# Patient Record
Sex: FEMALE | Race: WHITE | URBAN - METROPOLITAN AREA
[De-identification: names, ages, dates, MRNs, and addresses within clinical notes are randomized per-mention and may not be internally consistent; named-entity substitution may affect disease eponyms.]

---

## 2017-02-02 ENCOUNTER — OFFICE VISIT (OUTPATIENT)
Dept: INTERNAL MEDICINE CLINIC | Age: 52
End: 2017-02-02

## 2017-02-02 VITALS
DIASTOLIC BLOOD PRESSURE: 68 MMHG | TEMPERATURE: 98.2 F | HEART RATE: 60 BPM | HEIGHT: 66 IN | BODY MASS INDEX: 22.56 KG/M2 | WEIGHT: 140.4 LBS | SYSTOLIC BLOOD PRESSURE: 110 MMHG

## 2017-02-02 DIAGNOSIS — B35.9 TINEA: Primary | ICD-10-CM

## 2017-02-02 PROCEDURE — 99212 OFFICE O/P EST SF 10 MIN: CPT | Performed by: INTERNAL MEDICINE

## 2017-02-02 RX ORDER — CLOTRIMAZOLE 1 %
CREAM (GRAM) TOPICAL
Qty: 30 G | Refills: 0 | Status: SHIPPED | OUTPATIENT
Start: 2017-02-02 | End: 2017-05-08 | Stop reason: ALTCHOICE

## 2017-02-02 ASSESSMENT — ENCOUNTER SYMPTOMS: COLOR CHANGE: 1

## 2017-05-08 ENCOUNTER — OFFICE VISIT (OUTPATIENT)
Dept: INTERNAL MEDICINE CLINIC | Age: 52
End: 2017-05-08

## 2017-05-08 VITALS
HEIGHT: 66 IN | DIASTOLIC BLOOD PRESSURE: 70 MMHG | BODY MASS INDEX: 21.92 KG/M2 | HEART RATE: 89 BPM | TEMPERATURE: 98.3 F | SYSTOLIC BLOOD PRESSURE: 112 MMHG | WEIGHT: 136.4 LBS

## 2017-05-08 DIAGNOSIS — N39.0 URINARY TRACT INFECTION, SITE UNSPECIFIED: Primary | ICD-10-CM

## 2017-05-08 LAB
BILIRUBIN, POC: ABNORMAL
BLOOD URINE, POC: ABNORMAL
CLARITY, POC: ABNORMAL
COLOR, POC: YELLOW
GLUCOSE URINE, POC: ABNORMAL
KETONES, POC: 40
LEUKOCYTE EST, POC: ABNORMAL
NITRITE, POC: ABNORMAL
PH, POC: 5.5
PROTEIN, POC: 100
SPECIFIC GRAVITY, POC: 1.03
UROBILINOGEN, POC: 0.2

## 2017-05-08 PROCEDURE — 99212 OFFICE O/P EST SF 10 MIN: CPT | Performed by: INTERNAL MEDICINE

## 2017-05-08 PROCEDURE — 81002 URINALYSIS NONAUTO W/O SCOPE: CPT | Performed by: INTERNAL MEDICINE

## 2017-05-08 RX ORDER — CEFUROXIME AXETIL 250 MG/1
250 TABLET ORAL 2 TIMES DAILY
Qty: 14 TABLET | Refills: 0 | Status: SHIPPED | OUTPATIENT
Start: 2017-05-08 | End: 2018-02-13 | Stop reason: SDUPTHER

## 2017-05-08 ASSESSMENT — ENCOUNTER SYMPTOMS
ABDOMINAL DISTENTION: 1
NAUSEA: 1
DIARRHEA: 0
VOMITING: 0

## 2017-05-10 LAB
ORGANISM: ABNORMAL
URINE CULTURE, ROUTINE: ABNORMAL

## 2017-08-03 ENCOUNTER — TELEPHONE (OUTPATIENT)
Dept: INTERNAL MEDICINE CLINIC | Age: 52
End: 2017-08-03

## 2018-02-13 ENCOUNTER — OFFICE VISIT (OUTPATIENT)
Dept: INTERNAL MEDICINE CLINIC | Age: 53
End: 2018-02-13

## 2018-02-13 VITALS
SYSTOLIC BLOOD PRESSURE: 110 MMHG | DIASTOLIC BLOOD PRESSURE: 80 MMHG | BODY MASS INDEX: 22.69 KG/M2 | WEIGHT: 141.2 LBS | HEART RATE: 57 BPM | TEMPERATURE: 97.6 F | HEIGHT: 66 IN

## 2018-02-13 DIAGNOSIS — N39.0 URINARY TRACT INFECTION WITHOUT HEMATURIA, SITE UNSPECIFIED: Primary | ICD-10-CM

## 2018-02-13 LAB
BILIRUBIN, POC: ABNORMAL
BLOOD URINE, POC: ABNORMAL
CLARITY, POC: CLEAR
COLOR, POC: YELLOW
GLUCOSE URINE, POC: ABNORMAL
KETONES, POC: ABNORMAL
LEUKOCYTE EST, POC: ABNORMAL
NITRITE, POC: ABNORMAL
PH, POC: 7
PROTEIN, POC: ABNORMAL
SPECIFIC GRAVITY, POC: 1.02
UROBILINOGEN, POC: ABNORMAL

## 2018-02-13 PROCEDURE — 99213 OFFICE O/P EST LOW 20 MIN: CPT | Performed by: INTERNAL MEDICINE

## 2018-02-13 PROCEDURE — 81002 URINALYSIS NONAUTO W/O SCOPE: CPT | Performed by: INTERNAL MEDICINE

## 2018-02-13 RX ORDER — CEFUROXIME AXETIL 250 MG/1
250 TABLET ORAL 2 TIMES DAILY
Qty: 14 TABLET | Refills: 0 | Status: SHIPPED | OUTPATIENT
Start: 2018-02-13 | End: 2018-02-20

## 2018-02-13 ASSESSMENT — ENCOUNTER SYMPTOMS
NAUSEA: 0
VOMITING: 0

## 2018-02-15 LAB — URINE CULTURE, ROUTINE: NORMAL

## 2018-06-18 ENCOUNTER — TELEPHONE (OUTPATIENT)
Dept: INTERNAL MEDICINE CLINIC | Age: 53
End: 2018-06-18

## 2018-06-21 ENCOUNTER — TELEPHONE (OUTPATIENT)
Dept: INTERNAL MEDICINE CLINIC | Age: 53
End: 2018-06-21

## 2023-01-26 ENCOUNTER — HOSPITAL ENCOUNTER (OUTPATIENT)
Dept: PHYSICAL THERAPY | Age: 58
Setting detail: THERAPIES SERIES
Discharge: HOME | End: 2023-01-26
Payer: COMMERCIAL

## 2023-01-26 PROCEDURE — 97530 THERAPEUTIC ACTIVITIES: CPT

## 2023-01-26 PROCEDURE — 97140 MANUAL THERAPY 1/> REGIONS: CPT

## 2023-01-26 PROCEDURE — 97161 PT EVAL LOW COMPLEX 20 MIN: CPT

## 2023-01-26 NOTE — FLOWSHEET NOTE
[facility] - Outpatient Rehabilitation and Therapy, [facility]  [address] [facility], [facility]  Phone: [phone]   Fax:     [phone]      Physical Therapy Treatment Note/ Progress Report:     Date:  2023    Patient Name:  Prerna Yuan \"Ewa\"   :  1965  MRN: [id number]    Pertinent Medical History:     Medical/Treatment Diagnosis Information:  Medical Diagnosis: Closed displaced fracture of body of talus [S92.123A]  Treatment Diagnosis: pain, dec gait/ROM/strength limiting ADLs    Insurance/Certification information:  PT Insurance Information: [facility] - 18 visit; end date extension 23-3/31/23  Physician Information:  Jorden Smith MD  Plan of care signed (Y/N): faxed at [facility]     Date of Patient follow up with Physician:      Progress Report: []  Yes  [x]  No     Date Range for reporting period:  Beginnin2023  Ending:      Progress report due (10 Rx/or 30 days whichever is less): 39     Recertification due (POC duration/ or 90 days whichever is less):      Visit # POC/Insurance Allowable Auth Needed     18 visits   23-3/31/23 [x]Yes   []No     Latex Allergy:  [x]NO      []YES  Preferred Language for Healthcare:   [x]English       []Other:    Functional Scale:       Date assessed: at eval  Test:WOMAC  Score:Raw score 58    Pain level:  3-6/10     History of Injury: R ankle/talus fx on 22 while stepping off pallet at work. She had ORIF on . Most pain is across top of foot ranges from 3-6/10 with c/o pain waking her at night. She is not icing, edu on benefits of ice daily; taking OTC ibuprofen; has started some light yoga/pilates and TB PF ex; denies N/T. Pt is off work right now.      SUBJECTIVE:  See eval    OBJECTIVE:  Observation:   Test measurements:      RESTRICTIONS/PRECAUTIONS:     Exercises/Interventions:     Therapeutic Ex (32289)   Min:5 Reps/Resistance Notes/CUES  R ankle talus fx/ORIF   Airex  Edu for hep including: AROM, calf and inv/evr str - see below   GSS incline  Sol stretch incline vs step     MWM DF stretch on step     Standing pronate/supinate               Seated:   HR/TR   Inv/evr pillowcase pull   Heel slide          TB 4 way           Therapeutic Activity (10450) Min: 10 See below    Amb in ll bars: Fwd without HR               NMR re-education (50025)  Min:  CUES NEEDED   Gumdrop roll cw/ccw     Baps L1 PWB 4 way      Rocker board f/b s/s               Manual Intervention ([phone]) Min: 10     Joint mobs   TC, subtalar, MT   Gentle grd 1-2     PROM 4 way X 10 each                    Modalities  Min:x 15 min      CP  Supine w/ wedge x 15 min                 Other Therapeutic Activities: Pt was educated on PT POC, Diagnosis, Prognosis, pathomechanics as well as frequency and duration of scheduling future physical therapy appointments. Time was also taken on this day to answer all patient questions and participation in PT. Reviewed appointment policy in detail with patient and patient verbalized understanding. Home Exercise Program: Patient was instructed in the following for HEP:     . Patient verbalized/demonstrated understanding and was issued written handout. Access Code: [email or web address]  URL: [email or web address]  Date: 01/26/2023  Prepared by: Margarita Allen     Exercises  Supine Single Leg Ankle Pumps - 1-2 x daily - 7 x weekly - 1 sets - 10 reps  Supine Ankle Circles - 1-2 x daily - 7 x weekly - 1 sets - 10 reps  Supine Ankle Inversion Eversion AROM - 1-2 x daily - 7 x weekly - 1 sets - 10 reps  Seated Toe Curl - 1-2 x daily - 7 x weekly - 1 sets - 10 reps  Towel Scrunches - 1-2 x daily - 7 x weekly - 1 sets - 10 reps  Ankle Alphabet in Elevation - 1-2 x daily - 7 x weekly - 1 sets - 10 reps  Long Sitting Calf Stretch with Strap - 1 x daily - 7 x weekly - 1 sets - 3-5 reps - 10 hold  Seated Ankle Inversion Eversion PROM - 1 x daily - 7 x weekly - 3 sets - 3-5 reps - 10 hold    Therapeutic Exercise and NMR EXR  [x] (23974) Provided verbal/tactile cueing for activities related to strengthening, flexibility, endurance, ROM for improvements in LE, proximal hip, and core control with self care, mobility, lifting, ambulation.  [] (50981) Provided verbal/tactile cueing for activities related to improving balance, coordination, kinesthetic sense, posture, motor skill, proprioception  to assist with LE, proximal hip, and core control in self care, mobility, lifting, ambulation and eccentric single leg control.  [address] and Therapeutic Activities:    [x] (65045 or 84687) Provided verbal/tactile cueing for activities related to improving balance, coordination, kinesthetic sense, posture, motor skill, proprioception and motor activation to allow for proper function of core, proximal hip and LE with self care and ADLs and functional mobility.   [] (02578) Gait Re-education- Provided training and instruction to the patient for proper LE, core and proximal hip recruitment and positioning and eccentric body weight control with ambulation re-education including up and down stairs     Home Exercise Program:    [x] (69414) Reviewed/Progressed HEP activities related to strengthening, flexibility, endurance, ROM of core, proximal hip and LE for functional self-care, mobility, lifting and ambulation/stair navigation   [] (34723)Reviewed/Progressed HEP activities related to improving balance, coordination, kinesthetic sense, posture, motor skill, proprioception of core, proximal hip and LE for self care, mobility, lifting, and ambulation/stair navigation      Manual Treatments:  PROM / STM / Oscillations-Mobs:  G-I, II, III, IV (PA's, Inf., Post.)  [x] (29111) Provided manual therapy to mobilize LE, proximal hip and/or LS spine soft tissue/joints for the purpose of modulating pain, promoting relaxation,  increasing ROM, reducing/eliminating soft tissue swelling/inflammation/restriction, improving soft tissue extensibility and allowing for proper ROM for normal function with self care, mobility, lifting and ambulation. If BWC Please Indicate Time In/Out  CPT Code Time in Time out   Eval 8:50 9:10   Ex 9:10 9:15   NMR     Man 9:15 9:25   TA 9:25 9:35   CP 9:35 9:50       Charges:  Timed Code Treatment Minutes: 25   Total Treatment Minutes: 60      [x] EVAL (LOW) 79360 (typically 20 minutes face-to-face)  [] EVAL (MOD) 21529 (typically 30 minutes face-to-face)  [] EVAL (HIGH) 58625 (typically 45 minutes face-to-face)  [] RE-EVAL     [] XR(15373) x     [] Dry needle 1 or 2 Muscles (28967)  [] NMR (52019) x     [] Dry needle 3+ Muscles (36326)  [x] Manual (02325) x     [] Ultrasound (93820) x  [x] TA (91599) x     [] Mech Traction (41514)  [] ES(attended) (84021)     [] ES (un) (12766):   [] Vasopump (48209) [] Ionto (64285)   [] Other:    GOALS:  Patient stated goal: \" walk without brace\"  [] Progressing: [] Met: [] Not Met: [] Adjusted     Therapist goals for Patient:   Short Term Goals: To be achieved in: 2 weeks  1. Independent in HEP and progression per patient tolerance, in order to prevent re-injury. [] Progressing: [] Met: [] Not Met: [] Adjusted  2. Patient will have a decrease in pain by 20-30% to facilitate improvement in movement, function, and ADLs as indicated by Functional Deficits. [] Progressing: [] Met: [] Not Met: [] Adjusted     Long Term Goals: To be achieved in: at d/c   1. Improve WOMAC functional outcome score fby 10 points or more to assist with reaching prior level of function. [] Progressing: [] Met: [] Not Met: [] Adjusted  2. Patient will have a decrease in pain by 40-50% to facilitate improvement in movement, function, and ADLs as indicated by Functional Deficits. [] Progressing: [] Met: [] Not Met: [] Adjusted  3.  Patient will demonstrate increased AROM to +5 DF, 35 inv, 25 evr to allow for proper joint functioning as indicated by patients Functional Deficits. [] Progressing: [] Met: [] Not Met: [] Adjusted  4. Patient will demonstrate an increase in Strength to 5/5 in R ankle/LE to allow for proper functional mobility as indicated by patients Functional Deficits. [] Progressing: [] Met: [] Not Met: [] Adjusted  5. Patient will return to amb without brace/AD without increased symptoms or restriction. [] Progressing: [] Met: [] Not Met: [] Adjusted  6. \"Work and sports\"(patient specific functional goal)    [] Progressing: [] Met: [] Not Met: [] Adjusted         ASSESSMENT:  1/26: pt's s/s consistent with post surgical status R foot/ankle; skilled PT necessary for ROM/strength/gait    Treatment/Activity Tolerance:  [x] Patient tolerated treatment well [] Patient limited by fatique  [] Patient limited by pain  [] Patient limited by other medical complications  [] Other:     Overall Progression Towards Functional goals/ Treatment Progress Update:  [] Patient is progressing as expected towards functional goals listed. [] Progression is slowed due to complexities/Impairments listed. [] Progression has been slowed due to co-morbidities. [x] Plan just implemented, too soon to assess goals progression <30days   [] Goals require adjustment due to lack of progress  [] Patient is not progressing as expected and requires additional follow up with physician  [] Other    Prognosis for POC: [x] Good [] Fair  [] Poor    Patient requires continued skilled intervention: [x] Yes  [] No        PLAN: Add as indicated   [] Continue per plan of care [] Alter current plan (see comments)  [x] Plan of care initiated [] Hold pending MD visit [] Discharge    Electronically signed by: RETA Montemayor [id number]    Note: If patient does not return for scheduled/recommended follow up visits, this note will serve as a discharge from care along with the most recent update on progress.

## 2023-01-26 NOTE — PLAN OF CARE
[facility], [facility]  [address] [facility] [place], [facility]  Phone: [phone]   Fax:     [phone]                                                       Physical Therapy Certification    Dear Stanley Mckinnon MD,    We had the pleasure of evaluating the following patient for physical therapy services at [facility]. A summary of our findings can be found in the initial assessment below. This includes our plan of care. If you have any questions or concerns regarding these findings, please do not hesitate to contact me at the office phone number checked above. Thank you for the referral.       Physician Signature:_______________________________Date:__________________  By signing above (or electronic signature), therapists plan is approved by physician        Patient: Felicia Land   : 1965   MRN: [id number]  Referring Physician: Stanley Mckinnon MD      Evaluation Date: 2023      Medical Diagnosis Information:  Medical Diagnosis: Closed displaced fracture of body of talus [S92.123A]   Treatment Diagnosis: pain, dec gait/ROM/strength limiting ADLs                                         Insurance information: PT Insurance Information: [facility] - 18 v extension 23-3/31/23    Precautions/ Contra-indications: weaning from boot/brace and WBAT   Latex Allergy:  [x]NO      []YES  Preferred Language for Healthcare:   [x]English       []other:    C-SSRS Triggered by Intake questionnaire (Past 2 wk assessment ):   [x] No, Questionnaire did not trigger screening.   [] Yes, Patient intake triggered C-SSRS Screening      [] C-SSRS Screening completed  [] PCP notified via Epic     SUBJECTIVE: Patient stated complaint: R ankle/talus fx on 22 while stepping off pallet. She had ORIF on .   Most pain is across top of foot ranges from 3-6/10 with c/o pain waking her at night. She is not icing, edu on benefits of ice daily. Starting some light yoga/pilates and TB PF ex. Pt denies N/T. Pt is off work right now. Right now pt is:  Boot 20% of day - end of day   Brace 40% with activity   ACE wrap 40% of day       Relevant Medical History: NA  Functional Scale/Score: WOMAC = 58     Pain Scale: 3-6/10  Easing factors: ibuprofen  Provocative factors: too much activity     Type: [x]Constant   []Intermittent  []Radiating []Localized []other:     Numbness/Tingling: none    Occupation/School:work in [employer]     Living Status/Prior Level of Function: Independent with ADLs and IADLs,     OBJECTIVE:   Palpation: very mild TTP     Functional Mobility/Transfers: independent    Posture: WFL    Bandages/Dressings/Incisions: healed incision lat mal; mod edema lat mal around incision and dorsal foot     Gait: (include devices/WB status) at home without AD, out with 1 crutch L arm    ROM LEFT RIGHT   HIP Flex     HIP Abd     HIP Ext     HIP IR     HIP ER     Knee ext     Knee Flex     Ankle PF  50   Ankle DF  GS AROM -12  PROM -8  Sol AROM -6  PROM -3   Ankle In  AROM 22   Ankle Ev  AROM 18   Strength  LEFT RIGHT   HIP Flexors  5   HIP Abductors  5   HIP Ext  5   Hip ER     Knee EXT (quad)  5   Knee Flex (HS)  5   Ankle DF  4+   Ankle PF  MMT 4+   Ankle Inv  4+   Ankle EV  4+ mild soreness        Circumference  Mid apex  7 cm prox             Reflexes/Sensation:    [x]Dermatomes/Myotomes intact    [x]Reflexes equal and normal bilaterally   []Other:    Joint mobility:    []Normal    [x]Hypo   []Hyper    Orthopedic Special Tests:                        [x] Patient history, allergies, meds reviewed. Medical chart reviewed. See intake form. Review Of Systems (ROS):  [x]Performed Review of systems (Integumentary, CardioPulmonary, Neurological) by intake and observation. Intake form has been scanned into medical record.  Patient has been instructed to contact their primary care physician regarding ROS issues if not already being addressed at this time. Co-morbidities/Complexities (which will affect course of rehabilitation):   [x]None           Arthritic conditions   []Rheumatoid arthritis (M05.9)  []Osteoarthritis (M19.91)   Cardiovascular conditions   []Hypertension (I10)  []Hyperlipidemia (E78.5)  []Angina pectoris (I20)  []Atherosclerosis (I70)  []CVA Musculoskeletal conditions   []Disc pathology   []Congenital spine pathologies   []Prior surgical intervention  []Osteoporosis (M81.8)  []Osteopenia (M85.8)   Endocrine conditions   []Hypothyroid (E03.9)  []Hyperthyroid Gastrointestinal conditions   []Constipation (M74.42)   Metabolic conditions   []Morbid obesity (E66.01)  []Diabetes type 1(E10.65) or 2 (E11.65)   []Neuropathy (G60.9)     Pulmonary conditions   []Asthma (J45)  []Coughing   []COPD (J44.9)   Psychological Disorders  []Anxiety (F41.9)  []Depression (F32.9)   []Other:   []Other:          Barriers to/and or personal factors that will affect rehab potential:              []Age  []Sex    []Smoker              []Motivation/Lack of Motivation                        []Co-Morbidities              []Cognitive Function, education/learning barriers              []Environmental, home barriers              []profession/work barriers  []past PT/medical experience  []other:  Justification:     Falls Risk Assessment (30 days):   [x] Falls Risk assessed and no intervention required.   [] Falls Risk assessed and Patient requires intervention due to being higher risk   TUG score (>12s at risk):     [] Falls education provided, including         ASSESSMENT:   Functional Impairments:     []Noted lumbar/proximal hip/LE hypomobility   [x]Decreased LE functional ROM   []Decreased core/proximal hip strength and neuromuscular control   [x]Decreased LE functional strength   [x]Reduced balance/proprioceptive control   []other:      Functional Activity Limitations (from functional questionnaire and intake)   [x]Reduced ability to tolerate prolonged functional positions   []Reduced ability or difficulty with changes of positions or transfers between positions   []Reduced ability to maintain good posture and demonstrate good body mechanics with sitting, bending, and lifting   []Reduced ability to sleep   [x] Reduced ability or tolerance with driving and/or computer work   [x]Reduced ability to perform lifting, carrying tasks   [x]Reduced ability to squat   []Reduced ability to forward bend   [x]Reduced ability to ambulate prolonged functional periods/distances/surfaces   [x]Reduced ability to ascend/descend stairs   [x]Reduced ability to run, hop or jump   [x]other:ski/swim/run     Participation Restrictions   [x]Reduced participation in self care activities   [x]Reduced participation in home management activities   [x]Reduced participation in work activities   [x]Reduced participation in social activities. [x]Reduced participation in sport activities. Classification :    [x]Signs/symptoms consistent with post-surgical status including decreased ROM, strength and function.    []Signs/symptoms consistent with joint sprain/strain   []Signs/symptoms consistent with patella-femoral syndrome   []Signs/symptoms consistent with knee OA/hip OA   []Signs/symptoms consistent with internal derangement of knee/Hip   []Signs/symptoms consistent with functional hip weakness/NMR control      []Signs/symptoms consistent with tendinitis/tendinosis    []signs/symptoms consistent with pathology which may benefit from Dry needling      []other:      Prognosis/Rehab Potential:      []Excellent   [x]Good    []Fair   []Poor    Tolerance of evaluation/treatment:    []Excellent   [x]Good    []Fair   []Poor    Physical Therapy Evaluation Complexity Justification  [x] A history of present problem with:  [x] no personal factors and/or comorbidities that impact the plan of care;  []1-2 personal factors and/or comorbidities that impact the plan of care  []3 personal factors and/or comorbidities that impact the plan of care  [x] An examination of body systems using standardized tests and measures addressing any of the following: body structures and functions (impairments), activity limitations, and/or participation restrictions;:  [] a total of 1-2 or more elements   [] a total of 3 or more elements   [] a total of 4 or more elements   [x] A clinical presentation with:  [] stable and/or uncomplicated characteristics   [] evolving clinical presentation with changing characteristics  [] unstable and unpredictable characteristics;   [x] Clinical decision making of [x] low, [] moderate, [] high complexity using standardized patient assessment instrument and/or measurable assessment of functional outcome. [x] EVAL (LOW) 51517 (typically 20 minutes face-to-face)  [] EVAL (MOD) 00959 (typically 30 minutes face-to-face)  [] EVAL (HIGH) 30059 (typically 45 minutes face-to-face)  [] RE-EVAL     PLAN:  Frequency/Duration:  2-3 days per week for 6 Weeks:  Interventions:  [x]  Therapeutic exercise including: strength training, ROM, for Lower extremity and core   [x]  NMR activation and proprioception for LE, Glutes and Core   [x]  Manual therapy as indicated for LE, Hip and spine to include: Dry Needling/IASTM, STM, PROM, Gr I-IV mobilizations, manipulation. [x] Modalities as needed that may include: thermal agents, E-stim, Biofeedback, US, iontophoresis as indicated  [x] Patient education on joint protection, postural re-education, activity modification, progression of HEP. [x] Aquatic exercise including: strength training, ROM, and balance for Lower extremity and core     HEP instruction: Access Code: [email or web address]  URL: [email or web address]  Date: 01/26/2023  Prepared by: Sean Daly    Exercises  Supine Single Leg Ankle Pumps - 1-2 x daily - 7 x weekly - 1 sets - 10 reps  Supine Ankle Circles - 1-2 x daily - 7 x weekly - 1 sets - 10 reps  Supine Ankle Inversion Eversion AROM - 1-2 x daily - 7 x weekly - 1 sets - 10 reps  Seated Toe Curl - 1-2 x daily - 7 x weekly - 1 sets - 10 reps  Towel Scrunches - 1-2 x daily - 7 x weekly - 1 sets - 10 reps  Ankle Alphabet in Elevation - 1-2 x daily - 7 x weekly - 1 sets - 10 reps  Long Sitting Calf Stretch with Strap - 1 x daily - 7 x weekly - 1 sets - 3-5 reps - 10 hold  Seated Ankle Inversion Eversion PROM - 1 x daily - 7 x weekly - 3 sets - 3-5 reps - 10 hold      GOALS:  Patient stated goal: \" walk without brace\"  [] Progressing: [] Met: [] Not Met: [] Adjusted    Therapist goals for Patient:   Short Term Goals: To be achieved in: 2 weeks  1. Independent in HEP and progression per patient tolerance, in order to prevent re-injury. [] Progressing: [] Met: [] Not Met: [] Adjusted  2. Patient will have a decrease in pain by 20-30% to facilitate improvement in movement, function, and ADLs as indicated by Functional Deficits. [] Progressing: [] Met: [] Not Met: [] Adjusted    Long Term Goals: To be achieved in: at d/c   1. Improve WOMAC functional outcome score fby 10 points or more to assist with reaching prior level of function. [] Progressing: [] Met: [] Not Met: [] Adjusted  2. Patient will have a decrease in pain by 40-50% to facilitate improvement in movement, function, and ADLs as indicated by Functional Deficits. [] Progressing: [] Met: [] Not Met: [] Adjusted  3. Patient will demonstrate increased AROM to +5 DF, 35 inv, 25 evr to allow for proper joint functioning as indicated by patients Functional Deficits. [] Progressing: [] Met: [] Not Met: [] Adjusted  4. Patient will demonstrate an increase in Strength to 5/5 in R ankle/LE to allow for proper functional mobility as indicated by patients Functional Deficits. [] Progressing: [] Met: [] Not Met: [] Adjusted  5. Patient will return to amb without brace/AD without increased symptoms or restriction. [] Progressing: [] Met: [] Not Met: [] Adjusted  6. \"Work and sports\"(patient specific functional goal)    [] Progressing: [] Met: [] Not Met: [] Adjusted     Electronically signed by:  Yonatan Lockwood, PTMPT [id number]      Note: If patient does not return for scheduled/recommended follow up visits, this note will serve as a discharge from care along with the most recent update on progress.

## 2023-01-31 ENCOUNTER — HOSPITAL ENCOUNTER (OUTPATIENT)
Dept: PHYSICAL THERAPY | Age: 58
Setting detail: THERAPIES SERIES
Discharge: HOME | End: 2023-01-31
Payer: COMMERCIAL

## 2023-01-31 PROCEDURE — 97140 MANUAL THERAPY 1/> REGIONS: CPT

## 2023-01-31 PROCEDURE — 97112 NEUROMUSCULAR REEDUCATION: CPT

## 2023-01-31 PROCEDURE — 97110 THERAPEUTIC EXERCISES: CPT

## 2023-01-31 NOTE — FLOWSHEET NOTE
[facility] - Outpatient Rehabilitation and Therapy, [facility]  [address] [facility]  Phone: [phone]   Fax:     [phone]      Physical Therapy Treatment Note/ Progress Report:     Date:  2023    Patient Name:  Bay Levine \"Ewa\"   :  1965  MRN: [id number]    Pertinent Medical History:     Medical/Treatment Diagnosis Information:  Medical Diagnosis: Closed displaced fracture of body of talus [S92.123A]  Treatment Diagnosis: pain, dec gait/ROM/strength limiting ADLs    Insurance/Certification information:  PT Insurance Information: [facility] - 18 visit; end date extension 23-3/31/23  Physician Information:  Clarice Shah MD  Plan of care signed (Y/N): faxed at [facility]     Date of Patient follow up with Physician:      Progress Report: []  Yes  [x]  No     Date Range for reporting period:  Beginnin2023  Ending:      Progress report due (10 Rx/or 30 days whichever is less):      Recertification due (POC duration/ or 90 days whichever is less):      Visit # POC/Insurance Allowable Auth Needed     18 visits   23-3/31/23 [x]Yes   []No     Latex Allergy:  [x]NO      []YES  Preferred Language for Healthcare:   [x]English       []Other:    Functional Scale:       Date assessed: at [facility]  Test:WOMAC  Score:Raw score 58    Pain level:  1-2/10     History of Injury: R ankle/talus fx on 22 while stepping off pallet at work. She had ORIF on . Most pain is across top of foot ranges from 3-6/10 with c/o pain waking her at night. She is not icing, edu on benefits of ice daily; taking OTC ibuprofen; has started some light yoga/pilates and TB PF ex; denies N/T. Pt is off work right now.      SUBJECTIVE:  See eval  : just stiffness now with ibuprofen in her system; ankle circles hep is challenging, but overall feels pain intensity is getting better; to PT with 1 crutch and ace wrap on ankle     OBJECTIVE:  Observation:   Test measurements:      RESTRICTIONS/PRECAUTIONS:     Exercises/Interventions:     Therapeutic Ex (76858)   Min:22 Reps/Resistance Notes/CUES  R ankle talus fx/ORIF   Airex Seat 4 x 5 min  Edu for hep including: AROM, calf and inv/evr str - see below   GSS incline  Sol stretch incline  3 x 20 sec  3 x 20 sec      MWM DF stretch on step 5 x 10 sec     Standing pronate/supinate X 10 each               Seated:   HR/TR   Inv/evr pillowcase pull   Heel slide   X 10 each   X 10 each   X 10          TB 4 way           Therapeutic Activity (38682) Min:    Step up fwd  Step down     Amb in ll bars: Fwd without HR add              NMR re-education (07616)  Min:8  Cues for tech as needed    Gumdrop    roll cw/ccw   balance   Cw/ccw x 10 each       Baps 4 way  L1 PWB x 10 f/b s/s  X 5 cw/ccw   Min A for tech    Rocker board f/b s/s     Airex          Manual Intervention (62509) Min: 10     Joint mobs   TC, subtalar, MT   Gentle grd 1-2     PROM 4 way X 10 each                    Modalities  Min:x 15 min      CP  Supine w/ wedge x 15 min                 Other Therapeutic Activities: Pt was educated on PT POC, Diagnosis, Prognosis, pathomechanics as well as frequency and duration of scheduling future physical therapy appointments. Time was also taken on this day to answer all patient questions and participation in PT. Reviewed appointment policy in detail with patient and patient verbalized understanding. Home Exercise Program: Patient was instructed in the following for HEP:     . Patient verbalized/demonstrated understanding and was issued written handout. Access Code: [email or web address]  URL: [email or web address]  Date: 01/26/2023  Prepared by: Frank Aragon     Exercises  Supine Single Leg Ankle Pumps - 1-2 x daily - 7 x weekly - 1 sets - 10 reps  Supine Ankle Circles - 1-2 x daily - 7 x weekly - 1 sets - 10 reps  Supine Ankle Inversion Eversion AROM - 1-2 x daily - 7 x weekly - 1 sets - 10 reps  Seated Toe Curl - 1-2 x daily - 7 x weekly - 1 sets - 10 reps  Towel Scrunches - 1-2 x daily - 7 x weekly - 1 sets - 10 reps  Ankle Alphabet in Elevation - 1-2 x daily - 7 x weekly - 1 sets - 10 reps  Long Sitting Calf Stretch with Strap - 1 x daily - 7 x weekly - 1 sets - 3-5 reps - 10 hold  Seated Ankle Inversion Eversion PROM - 1 x daily - 7 x weekly - 3 sets - 3-5 reps - 10 hold    Access Code: [email or web address]  URL: [employer]/  Date: 01/31/2023  Prepared by: Behzad Osgood    Exercises  Seated Heel Raise - 1 x daily - 7 x weekly - 1 sets - 10 reps  Seated Toe Raise - 1 x daily - 7 x weekly - 1 sets - 10 reps  Seated Ankle Inversion AROM - 1 x daily - 7 x weekly - 1 sets - 10 reps  Seated Ankle Inversion Eversion AROM - 1 x daily - 7 x weekly - 1 sets - 10 reps  Seated Heel Slide - 1 x daily - 7 x weekly - 1 sets - 10 reps      Therapeutic Exercise and NMR EXR  [x] (40266) Provided verbal/tactile cueing for activities related to strengthening, flexibility, endurance, ROM for improvements in LE, proximal hip, and core control with self care, mobility, lifting, ambulation.  [] (97441) Provided verbal/tactile cueing for activities related to improving balance, coordination, kinesthetic sense, posture, motor skill, proprioception  to assist with LE, proximal hip, and core control in self care, mobility, lifting, ambulation and eccentric single leg control.  [address] and Therapeutic Activities:    [x] (78350 or 45203) Provided verbal/tactile cueing for activities related to improving balance, coordination, kinesthetic sense, posture, motor skill, proprioception and motor activation to allow for proper function of core, proximal hip and LE with self care and ADLs and functional mobility.   [] (98577) Gait Re-education- Provided training and instruction to the patient for proper LE, core and proximal hip recruitment and positioning and eccentric body weight control with ambulation re-education including up and down stairs     Home Exercise Program:    [x] (14548) Reviewed/Progressed HEP activities related to strengthening, flexibility, endurance, ROM of core, proximal hip and LE for functional self-care, mobility, lifting and ambulation/stair navigation   [] (16817)Reviewed/Progressed HEP activities related to improving balance, coordination, kinesthetic sense, posture, motor skill, proprioception of core, proximal hip and LE for self care, mobility, lifting, and ambulation/stair navigation      Manual Treatments:  PROM / STM / Oscillations-Mobs:  G-I, II, III, IV (PA's, Inf., Post.)  [x] (60152) Provided manual therapy to mobilize LE, proximal hip and/or LS spine soft tissue/joints for the purpose of modulating pain, promoting relaxation,  increasing ROM, reducing/eliminating soft tissue swelling/inflammation/restriction, improving soft tissue extensibility and allowing for proper ROM for normal function with self care, mobility, lifting and ambulation. If [facility] Please Indicate Time In/Out  CPT Code Time in Time out   Eval     Ex 3:00 3:22   NMR 3:22 3:30   TA     man 3:30 3:40   CP 3:40 3:55       Charges:  Timed Code Treatment Minutes: 40   Total Treatment Minutes: 55      [] EVAL (LOW) 46337 (typically 20 minutes face-to-face)  [] EVAL (MOD) 51912 (typically 30 minutes face-to-face)  [] EVAL (HIGH) 71565 (typically 45 minutes face-to-face)  [] RE-EVAL     [x] YB(78489) x     [] Dry needle 1 or 2 Muscles (05431)  [x] NMR (05138) x     [] Dry needle 3+ Muscles (39615)  [x] Manual (13991) x     [] Ultrasound (45388) x  [] TA (31334) x     [] Mech Traction (98688)  [] ES(attended) (87104)     [] ES (un) (22 020605):   [] Vasopump (23557) [] Ionto (78759)   [] Other:    GOALS:  Patient stated goal: \" walk without brace\"  [] Progressing: [] Met: [] Not Met: [] Adjusted     Therapist goals for Patient:   Short Term Goals: To be achieved in: 2 weeks  1.  Independent in HEP and progression per patient tolerance, in order to prevent re-injury. [] Progressing: [] Met: [] Not Met: [] Adjusted  2. Patient will have a decrease in pain by 20-30% to facilitate improvement in movement, function, and ADLs as indicated by Functional Deficits. [] Progressing: [] Met: [] Not Met: [] Adjusted     Long Term Goals: To be achieved in: at d/c   1. Improve WOMAC functional outcome score fby 10 points or more to assist with reaching prior level of function. [] Progressing: [] Met: [] Not Met: [] Adjusted  2. Patient will have a decrease in pain by 40-50% to facilitate improvement in movement, function, and ADLs as indicated by Functional Deficits. [] Progressing: [] Met: [] Not Met: [] Adjusted  3. Patient will demonstrate increased AROM to +5 DF, 35 inv, 25 evr to allow for proper joint functioning as indicated by patients Functional Deficits. [] Progressing: [] Met: [] Not Met: [] Adjusted  4. Patient will demonstrate an increase in Strength to 5/5 in R ankle/LE to allow for proper functional mobility as indicated by patients Functional Deficits. [] Progressing: [] Met: [] Not Met: [] Adjusted  5. Patient will return to amb without brace/AD without increased symptoms or restriction. [] Progressing: [] Met: [] Not Met: [] Adjusted  6. \"Work and sports\"(patient specific functional goal)    [] Progressing: [] Met: [] Not Met: [] Adjusted         ASSESSMENT:  1/26: pt's s/s consistent with post surgical status R foot/ankle; skilled PT necessary for ROM/strength/gait  1/31: skilled PT to gradually progress WB, ROM, balance and strength; [place] ex well      Treatment/Activity Tolerance:  [x] Patient tolerated treatment well [] Patient limited by fatique  [] Patient limited by pain  [] Patient limited by other medical complications  [] Other:     Overall Progression Towards Functional goals/ Treatment Progress Update:  [] Patient is progressing as expected towards functional goals listed.     [] Progression is slowed due to complexities/Impairments listed. [] Progression has been slowed due to co-morbidities. [x] Plan just implemented, too soon to assess goals progression <30days   [] Goals require adjustment due to lack of progress  [] Patient is not progressing as expected and requires additional follow up with physician  [] Other    Prognosis for POC: [x] Good [] Fair  [] Poor    Patient requires continued skilled intervention: [x] Yes  [] No        PLAN: Add as indicated above  [x] Continue per plan of care [] Alter current plan (see comments)  [] Plan of care initiated [] Hold pending MD visit [] Discharge    Electronically signed by: Rajinder Davis, PTMPT [id number]    Note: If patient does not return for scheduled/recommended follow up visits, this note will serve as a discharge from care along with the most recent update on progress.

## 2023-02-02 ENCOUNTER — HOSPITAL ENCOUNTER (OUTPATIENT)
Dept: PHYSICAL THERAPY | Age: 58
Setting detail: THERAPIES SERIES
Discharge: HOME | End: 2023-02-02

## 2023-02-02 NOTE — FLOWSHEET NOTE
[place] [facility], [facility]  [address] [facility], [facility]  Phone: [phone]   Fax:     [phone]    Physical Therapy  Cancellation/No-show Note  Patient Name:  Crissy Villanueva  :  1965   Date:  2023  Cancelled visits to date: 1  No-shows to date: 0    Patient status for today's appointment patient:  [x]  Cancelled  []  Rescheduled appointment  []  No-show     Reason given by patient:  [x]  Patient ill  []  Conflicting appointment  []  No transportation    []  Conflict with work  []  No reason given  []  Other:     Comments:      Phone call information:   []  Phone call made today to patient at _ time at number provided:      []  Patient answered, conversation as follows:    []  Patient did not answer, message left as follows:  [x]  Phone call not made today    Electronically signed by:  Zaira John, PTMPT [id number]

## 2023-02-07 ENCOUNTER — HOSPITAL ENCOUNTER (OUTPATIENT)
Dept: PHYSICAL THERAPY | Age: 58
Setting detail: THERAPIES SERIES
Discharge: HOME | End: 2023-02-07
Payer: COMMERCIAL

## 2023-02-07 PROCEDURE — 97530 THERAPEUTIC ACTIVITIES: CPT

## 2023-02-07 PROCEDURE — 97140 MANUAL THERAPY 1/> REGIONS: CPT

## 2023-02-07 PROCEDURE — 97110 THERAPEUTIC EXERCISES: CPT

## 2023-02-07 NOTE — FLOWSHEET NOTE
[facility] - Outpatient Rehabilitation and Therapy, [facility]  [address] [facility], [facility]  Phone: [phone]   Fax:     [phone]      Physical Therapy Treatment Note/ Progress Report:     Date:  2023    Patient Name:  Frank Carrillo \"Ewa\"   :  1965  MRN: [id number]    Pertinent Medical History:     Medical/Treatment Diagnosis Information:  Medical Diagnosis: Closed displaced fracture of body of talus [S92.123A]  Treatment Diagnosis: pain, dec gait/ROM/strength limiting ADLs    Insurance/Certification information:  PT Insurance Information: [facility] - 18 visit; end date extension 23-3/31/23  Physician Information:  Jose Lam MD  Plan of care signed (Y/N): yes    Date of Patient follow up with Physician:      Progress Report: []  Yes  [x]  No     Date Range for reporting period:  Beginnin2023  Ending:      Progress report due (10 Rx/or 30 days whichever is less):      Recertification due (POC duration/ or 90 days whichever is less):      Visit # POC/Insurance Allowable Auth Needed   3 / 18  18 visits   23-3/31/23 [x]Yes   []No     Latex Allergy:  [x]NO      []YES  Preferred Language for Healthcare:   [x]English       []Other:    Functional Scale:       Date assessed: at eval  Test:WOMAC  Score:Raw score 58    Pain level:  4/10     History of Injury: R ankle/talus fx on 22 while stepping off pallet at work. She had ORIF on . Most pain is across top of foot ranges from 3-6/10 with c/o pain waking her at night. She is not icing, edu on benefits of ice daily; taking OTC ibuprofen; has started some light yoga/pilates and TB PF ex; denies N/T. Pt is off work right now.      SUBJECTIVE:  See eval  : just stiffness now with ibuprofen in her system; ankle circles hep is challenging, but overall feels pain intensity is getting better; to PT with 1 crutch and ace wrap on ankle 2/7: working through some AM stiffness    OBJECTIVE:  Observation:   Test measurements:      RESTRICTIONS/PRECAUTIONS:     Exercises/Interventions:     Therapeutic Ex (63658)   Min:24 Reps/Resistance Notes/CUES  R ankle talus fx/ORIF   Airex Seat 4 x 5 min  Edu for hep including: AROM, calf and inv/evr str - see below   GSS incline  Sol stretch incline  3 x 20 sec  3 x 20 sec      MWM DF stretch on step 5 x 10 sec     Standing pronate/supinate X 10 each     Standing   HR/TR   Mini squats   X 10 each   X 10                 Seated:   HR/TR   Inv/evr pillowcase pull   Heel slide           TB 4 way  Red x 10 each  Pt has several bands at home        Therapeutic Activity (08902) Min:8    Step up fwd  Step down 4\" x 5  4\" x 5    Amb in ll bars: Fwd without HR X 4  Good tech             NMR re-education (59569)  Min:5  Cues for tech as needed    Gumdrop    roll cw/ccw   balance   Cw/ccw x 10 each   add    Baps 4 way  L1 PWB x 10 f/b s/s  X 10 cw/ccw   Min A for tech    SLS     Rocker board f/b s/s     Airex          Manual Intervention ([phone]) Min: 8     Joint mobs   TC, subtalar, MT   Gentle grd 1-2     PROM 4 way X 10 each                    Modalities  Min:x 15 min      CP  Supine w/ wedge x 15 min                 Other Therapeutic Activities: Pt was educated on PT POC, Diagnosis, Prognosis, pathomechanics as well as frequency and duration of scheduling future physical therapy appointments. Time was also taken on this day to answer all patient questions and participation in PT. Reviewed appointment policy in detail with patient and patient verbalized understanding. Home Exercise Program: Patient was instructed in the following for HEP:     . Patient verbalized/demonstrated understanding and was issued written handout. Access Code: [email or web address]  URL: [email or web address]  Date: 01/26/2023  Prepared by: Lashawn Graham     Exercises  Supine Single Leg Ankle Pumps - 1-2 x daily - 7 x weekly - 1 sets - 10 reps  Supine Ankle Circles - 1-2 x daily - 7 x weekly - 1 sets - 10 reps  Supine Ankle Inversion Eversion AROM - 1-2 x daily - 7 x weekly - 1 sets - 10 reps  Seated Toe Curl - 1-2 x daily - 7 x weekly - 1 sets - 10 reps  Towel Scrunches - 1-2 x daily - 7 x weekly - 1 sets - 10 reps  Ankle Alphabet in Elevation - 1-2 x daily - 7 x weekly - 1 sets - 10 reps  Long Sitting Calf Stretch with Strap - 1 x daily - 7 x weekly - 1 sets - 3-5 reps - 10 hold  Seated Ankle Inversion Eversion PROM - 1 x daily - 7 x weekly - 3 sets - 3-5 reps - 10 hold    Access Code: [email or web address]  URL: [employer]/  Date: 01/31/2023  Prepared by: Felix Castro    Exercises  Seated Heel Raise - 1 x daily - 7 x weekly - 1 sets - 10 reps  Seated Toe Raise - 1 x daily - 7 x weekly - 1 sets - 10 reps  Seated Ankle Inversion AROM - 1 x daily - 7 x weekly - 1 sets - 10 reps  Seated Ankle Inversion Eversion AROM - 1 x daily - 7 x weekly - 1 sets - 10 reps  Seated Heel Slide - 1 x daily - 7 x weekly - 1 sets - 10 reps    Access Code: [email or web address]  URL: [email or web address]  Date: 02/07/2023  Prepared by: Felix Mejiaert    Exercises  Ankle Dorsiflexion with Resistance - 1 x daily - 7 x weekly - 1 sets - 10 reps  Seated Ankle Dorsiflexion with Resistance - 1 x daily - 7 x weekly - 1 sets - 10 reps  Ankle and Toe Plantarflexion with Resistance - 1 x daily - 7 x weekly - 1 sets - 10 reps  Seated Ankle Inversion with Resistance and Legs Crossed - 1 x daily - 7 x weekly - 1 sets - 10 reps  Long Sitting Ankle Eversion with Resistance - 1 x daily - 7 x weekly - 1 sets - 10 reps        Therapeutic Exercise and NMR EXR  [x] (07401) Provided verbal/tactile cueing for activities related to strengthening, flexibility, endurance, ROM for improvements in LE, proximal hip, and core control with self care, mobility, lifting, ambulation.  [] (38003) Provided verbal/tactile cueing for activities related to improving balance, coordination, kinesthetic sense, posture, motor skill, proprioception  to assist with LE, proximal hip, and core control in self care, mobility, lifting, ambulation and eccentric single leg control. [address] and Therapeutic Activities:    [x] (59205 or 16973) Provided verbal/tactile cueing for activities related to improving balance, coordination, kinesthetic sense, posture, motor skill, proprioception and motor activation to allow for proper function of core, proximal hip and LE with self care and ADLs and functional mobility.   [] (15097) Gait Re-education- Provided training and instruction to the patient for proper LE, core and proximal hip recruitment and positioning and eccentric body weight control with ambulation re-education including up and down stairs     Home Exercise Program:    [x] (09378) Reviewed/Progressed HEP activities related to strengthening, flexibility, endurance, ROM of core, proximal hip and LE for functional self-care, mobility, lifting and ambulation/stair navigation   [] (33885)Reviewed/Progressed HEP activities related to improving balance, coordination, kinesthetic sense, posture, motor skill, proprioception of core, proximal hip and LE for self care, mobility, lifting, and ambulation/stair navigation      Manual Treatments:  PROM / STM / Oscillations-Mobs:  G-I, II, III, IV (PA's, Inf., Post.)  [x] (14625) Provided manual therapy to mobilize LE, proximal hip and/or LS spine soft tissue/joints for the purpose of modulating pain, promoting relaxation,  increasing ROM, reducing/eliminating soft tissue swelling/inflammation/restriction, improving soft tissue extensibility and allowing for proper ROM for normal function with self care, mobility, lifting and ambulation.      If [facility] Please Indicate Time In/Out  CPT Code Time in Time out   Eval     Ex 10:35 10:59   NMR 10:59 11:04   TA 11:04 11:12   man 11:12 11:20   CP 11:20 11:35       Charges:  Timed Code Treatment Minutes: 45   Total Treatment Minutes: 60      [] EVAL (LOW) 11768 (typically 20 minutes face-to-face)  [] EVAL (MOD) 13733 (typically 30 minutes face-to-face)  [] EVAL (HIGH) 42003 (typically 45 minutes face-to-face)  [] RE-EVAL     [x] VX(95039) x     [] Dry needle 1 or 2 Muscles (43126)  [] NMR (92147) x     [] Dry needle 3+ Muscles (95629)  [x] Manual (33964) x     [] Ultrasound (98555) x  [x] TA (03205) x     [] Mech Traction (44216)  [] ES(attended) (65463)     [] ES (un) (70621):   [] Vasopump (87418) [] Ionto (64591)   [] Other:    GOALS:  Patient stated goal: \" walk without brace\"  [] Progressing: [] Met: [] Not Met: [] Adjusted     Therapist goals for Patient:   Short Term Goals: To be achieved in: 2 weeks  1. Independent in HEP and progression per patient tolerance, in order to prevent re-injury. [] Progressing: [] Met: [] Not Met: [] Adjusted  2. Patient will have a decrease in pain by 20-30% to facilitate improvement in movement, function, and ADLs as indicated by Functional Deficits. [] Progressing: [] Met: [] Not Met: [] Adjusted     Long Term Goals: To be achieved in: at d/c   1. Improve WOMAC functional outcome score fby 10 points or more to assist with reaching prior level of function. [] Progressing: [] Met: [] Not Met: [] Adjusted  2. Patient will have a decrease in pain by 40-50% to facilitate improvement in movement, function, and ADLs as indicated by Functional Deficits. [] Progressing: [] Met: [] Not Met: [] Adjusted  3. Patient will demonstrate increased AROM to +5 DF, 35 inv, 25 evr to allow for proper joint functioning as indicated by patients Functional Deficits. [] Progressing: [] Met: [] Not Met: [] Adjusted  4. Patient will demonstrate an increase in Strength to 5/5 in R ankle/LE to allow for proper functional mobility as indicated by patients Functional Deficits. [] Progressing: [] Met: [] Not Met: [] Adjusted  5. Patient will return to amb without brace/AD without increased symptoms or restriction.    [] Progressing: [] Met: [] Not Met: [] Adjusted  6. \"Work and sports\"(patient specific functional goal)    [] Progressing: [] Met: [] Not Met: [] Adjusted         ASSESSMENT:  1/26: pt's s/s consistent with post surgical status R foot/ankle; skilled PT necessary for ROM/strength/gait  1/31: skilled PT to gradually progress WB, ROM, balance and strength; [place] ex well   2/7: tolerated WB progression well today with skilled PT/supervision      Treatment/Activity Tolerance:  [x] Patient tolerated treatment well [] Patient limited by fatique  [] Patient limited by pain  [] Patient limited by other medical complications  [] Other:     Overall Progression Towards Functional goals/ Treatment Progress Update:  [] Patient is progressing as expected towards functional goals listed. [] Progression is slowed due to complexities/Impairments listed. [] Progression has been slowed due to co-morbidities. [x] Plan just implemented, too soon to assess goals progression <30days   [] Goals require adjustment due to lack of progress  [] Patient is not progressing as expected and requires additional follow up with physician  [] Other    Prognosis for POC: [x] Good [] Fair  [] Poor    Patient requires continued skilled intervention: [x] Yes  [] No        PLAN: Add as indicated above  [x] Continue per plan of care [] Alter current plan (see comments)  [] Plan of care initiated [] Hold pending MD visit [] Discharge    Electronically signed by: Helio Trejo, PTMPT [id number]    Note: If patient does not return for scheduled/recommended follow up visits, this note will serve as a discharge from care along with the most recent update on progress.

## 2023-02-09 ENCOUNTER — HOSPITAL ENCOUNTER (OUTPATIENT)
Dept: PHYSICAL THERAPY | Age: 58
Setting detail: THERAPIES SERIES
Discharge: HOME | End: 2023-02-09
Payer: COMMERCIAL

## 2023-02-09 PROCEDURE — 97112 NEUROMUSCULAR REEDUCATION: CPT

## 2023-02-09 PROCEDURE — 97110 THERAPEUTIC EXERCISES: CPT

## 2023-02-09 PROCEDURE — 97140 MANUAL THERAPY 1/> REGIONS: CPT

## 2023-02-09 NOTE — FLOWSHEET NOTE
[facility] - Outpatient Rehabilitation and Therapy, [place]  [address] [place] [address]  Phone: [phone]   Fax:     [phone]      Physical Therapy Treatment Note/ Progress Report:     Date:  2023    Patient Name:  Aris Chawla \"Ewa\"   :  1965  MRN: [id number]    Pertinent Medical History:     Medical/Treatment Diagnosis Information:  Medical Diagnosis: Closed displaced fracture of body of talus [S92.123A]  Treatment Diagnosis: pain, dec gait/ROM/strength limiting ADLs    Insurance/Certification information:  PT Insurance Information: [facility] - 18 visit; end date extension 23-3/31/23  Physician Information:  Simone Pineda MD  Plan of care signed (Y/N): yes    Date of Patient follow up with Physician:      Progress Report: []  Yes  [x]  No     Date Range for reporting period:  Beginnin2023  Ending:      Progress report due (10 Rx/or 30 days whichever is less): 5/43/15     Recertification due (POC duration/ or 90 days whichever is less):      Visit # POC/Insurance Allowable Auth Needed     18 visits   23-3/31/23 [x]Yes   []No     Latex Allergy:  [x]NO      []YES  Preferred Language for Healthcare:   [x]English       []Other:    Functional Scale:       Date assessed: at eval  Test:WOMAC  Score:Raw score 58    Pain level:  1-2/10     History of Injury: R ankle/talus fx on 22 while stepping off pallet at work. She had ORIF on . Most pain is across top of foot ranges from 3-6/10 with c/o pain waking her at night. She is not icing, edu on benefits of ice daily; taking OTC ibuprofen; has started some light yoga/pilates and TB PF ex; denies N/T. Pt is off work right now.      SUBJECTIVE:  See eval  : just stiffness now with ibuprofen in her system; ankle circles hep is challenging, but overall feels pain intensity is getting better; to PT with 1 crutch and ace wrap on ankle 2/7: working through some AM stiffness  2/9: not much pain, more stiffness posterior ankle; pt belongs to [facility] and was able to go and swim a few laps of breaststroke and freestyle without much c/o     OBJECTIVE:  Observation:   Test measurements:      RESTRICTIONS/PRECAUTIONS:     Exercises/Interventions:     Therapeutic Ex (42994)   Min:20 Reps/Resistance Notes/CUES  R ankle talus fx/ORIF   Airex Seat 4 x 5 min  Edu for hep including: AROM, calf and inv/evr str - see below   GSS incline  Sol stretch incline  3 x 20 sec  3 x 20 sec      MWM DF stretch on step 5 x 10 sec     Standing   HR/TR   Mini squats   Pronate/supinate   X 10 each   X 10   X 10 each    Fitter f/b add                   TB 4 way    Hep - Pt has several bands at home        Therapeutic Activity (95566) Min:5    Step up fwd  Step down 4\" x 10  4\" x 10    Amb in ll bars:   Line walk   Retro walk   Lat walk  add To PT with 1 crutch but able to amb b/w ex without AD              NMR re-education (13929)  Min:10  Cues for tech as needed    Gumdrop    roll cw/ccw   balance   Cw/ccw x 10 each   Right side up x 30 sec     Baps 4 way  L1 PWB x 10 f/b s/s  X 10 cw/ccw      SLS 2 x 10 sec     Rocker board f/b s/s X 30 sec each    Airex Step up R x 5          Manual Intervention (67467) Min: 10     Joint mobs   TC, subtalar, MT   Gentle grd 1-2     PROM 4 way X 10 each                    Modalities  Min:x 15 min      CP  Supine w/ wedge x 15 min                 Other Therapeutic Activities: Pt was educated on PT POC, Diagnosis, Prognosis, pathomechanics as well as frequency and duration of scheduling future physical therapy appointments. Time was also taken on this day to answer all patient questions and participation in PT. Reviewed appointment policy in detail with patient and patient verbalized understanding.      Home Exercise Program: Patient was instructed in the following for HEP:     . Patient verbalized/demonstrated understanding and was issued written handout. Access Code: [email or web address]  URL: [employer]/  Date: 01/26/2023  Prepared by: Mat Carry     Exercises  Supine Single Leg Ankle Pumps - 1-2 x daily - 7 x weekly - 1 sets - 10 reps  Supine Ankle Circles - 1-2 x daily - 7 x weekly - 1 sets - 10 reps  Supine Ankle Inversion Eversion AROM - 1-2 x daily - 7 x weekly - 1 sets - 10 reps  Seated Toe Curl - 1-2 x daily - 7 x weekly - 1 sets - 10 reps  Towel Scrunches - 1-2 x daily - 7 x weekly - 1 sets - 10 reps  Ankle Alphabet in Elevation - 1-2 x daily - 7 x weekly - 1 sets - 10 reps  Long Sitting Calf Stretch with Strap - 1 x daily - 7 x weekly - 1 sets - 3-5 reps - 10 hold  Seated Ankle Inversion Eversion PROM - 1 x daily - 7 x weekly - 3 sets - 3-5 reps - 10 hold    Access Code: [email or web address]  URL: [employer]/  Date: 01/31/2023  Prepared by: Mat Carry    Exercises  Seated Heel Raise - 1 x daily - 7 x weekly - 1 sets - 10 reps  Seated Toe Raise - 1 x daily - 7 x weekly - 1 sets - 10 reps  Seated Ankle Inversion AROM - 1 x daily - 7 x weekly - 1 sets - 10 reps  Seated Ankle Inversion Eversion AROM - 1 x daily - 7 x weekly - 1 sets - 10 reps  Seated Heel Slide - 1 x daily - 7 x weekly - 1 sets - 10 reps    Access Code: [email or web address]  URL: [employer]/  Date: 02/07/2023  Prepared by: Mat Carry    Exercises  Ankle Dorsiflexion with Resistance - 1 x daily - 7 x weekly - 1 sets - 10 reps  Seated Ankle Dorsiflexion with Resistance - 1 x daily - 7 x weekly - 1 sets - 10 reps  Ankle and Toe Plantarflexion with Resistance - 1 x daily - 7 x weekly - 1 sets - 10 reps  Seated Ankle Inversion with Resistance and Legs Crossed - 1 x daily - 7 x weekly - 1 sets - 10 reps  Long Sitting Ankle Eversion with Resistance - 1 x daily - 7 x weekly - 1 sets - 10 reps        Therapeutic Exercise and NMR EXR  [x] (46212) Provided verbal/tactile cueing for activities related to strengthening, flexibility, endurance, ROM for improvements in LE, proximal hip, and core control with self care, mobility, lifting, ambulation.  [] (97516) Provided verbal/tactile cueing for activities related to improving balance, coordination, kinesthetic sense, posture, motor skill, proprioception  to assist with LE, proximal hip, and core control in self care, mobility, lifting, ambulation and eccentric single leg control. [address] and Therapeutic Activities:    [x] (64594 or 35060) Provided verbal/tactile cueing for activities related to improving balance, coordination, kinesthetic sense, posture, motor skill, proprioception and motor activation to allow for proper function of core, proximal hip and LE with self care and ADLs and functional mobility.   [] (57586) Gait Re-education- Provided training and instruction to the patient for proper LE, core and proximal hip recruitment and positioning and eccentric body weight control with ambulation re-education including up and down stairs     Home Exercise Program:    [x] (60706) Reviewed/Progressed HEP activities related to strengthening, flexibility, endurance, ROM of core, proximal hip and LE for functional self-care, mobility, lifting and ambulation/stair navigation   [] (79806)Reviewed/Progressed HEP activities related to improving balance, coordination, kinesthetic sense, posture, motor skill, proprioception of core, proximal hip and LE for self care, mobility, lifting, and ambulation/stair navigation      Manual Treatments:  PROM / STM / Oscillations-Mobs:  G-I, II, III, IV (PA's, Inf., Post.)  [x] (20525) Provided manual therapy to mobilize LE, proximal hip and/or LS spine soft tissue/joints for the purpose of modulating pain, promoting relaxation,  increasing ROM, reducing/eliminating soft tissue swelling/inflammation/restriction, improving soft tissue extensibility and allowing for proper ROM for normal function with self care, mobility, lifting and ambulation.      If BWC Please Indicate Time In/Out  CPT Code Time in Time out   Eval     Ex 10:30 10:50   NMR 10:50 11:00   TA 11:00 11:05   man 11:05 11:15   CP 11:15 11:30       Charges:  Timed Code Treatment Minutes: 45   Total Treatment Minutes: 60      [] EVAL (LOW) 70188 (typically 20 minutes face-to-face)  [] EVAL (MOD) 51388 (typically 30 minutes face-to-face)  [] EVAL (HIGH) 12086 (typically 45 minutes face-to-face)  [] RE-EVAL     [x] DL(86757) x     [] Dry needle 1 or 2 Muscles (30647)  [x] NMR (92441) x     [] Dry needle 3+ Muscles (86438)  [x] Manual (78531) x     [] Ultrasound (98988) x  [] TA (31413) x     [] Mech Traction (25433)  [] ES(attended) (26697)     [] ES (un) (92935):   [] Vasopump (42895) [] Ionto (76243)   [] Other:    GOALS:  Patient stated goal: \" walk without brace\"  [] Progressing: [] Met: [] Not Met: [] Adjusted     Therapist goals for Patient:   Short Term Goals: To be achieved in: 2 weeks  1. Independent in HEP and progression per patient tolerance, in order to prevent re-injury. [] Progressing: [] Met: [] Not Met: [] Adjusted  2. Patient will have a decrease in pain by 20-30% to facilitate improvement in movement, function, and ADLs as indicated by Functional Deficits. [] Progressing: [] Met: [] Not Met: [] Adjusted     Long Term Goals: To be achieved in: at d/c   1. Improve WOMAC functional outcome score fby 10 points or more to assist with reaching prior level of function. [] Progressing: [] Met: [] Not Met: [] Adjusted  2. Patient will have a decrease in pain by 40-50% to facilitate improvement in movement, function, and ADLs as indicated by Functional Deficits. [] Progressing: [] Met: [] Not Met: [] Adjusted  3. Patient will demonstrate increased AROM to +5 DF, 35 inv, 25 evr to allow for proper joint functioning as indicated by patients Functional Deficits. [] Progressing: [] Met: [] Not Met: [] Adjusted  4.  Patient will demonstrate an increase in Strength to 5/5 in R ankle/LE to allow for proper functional mobility as indicated by patients Functional Deficits. [] Progressing: [] Met: [] Not Met: [] Adjusted  5. Patient will return to amb without brace/AD without increased symptoms or restriction. [] Progressing: [] Met: [] Not Met: [] Adjusted  6. \"Work and sports\"(patient specific functional goal)    [] Progressing: [] Met: [] Not Met: [] Adjusted         ASSESSMENT:  1/26: pt's s/s consistent with post surgical status R foot/ankle; skilled PT necessary for ROM/strength/gait  1/31: skilled PT to gradually progress WB, ROM, balance and strength; [place] new ex well   2/7: tolerated WB progression well today with skilled PT/supervision    2/9: pt belongs to [facility] and was able to go and swim a few laps of breaststroke and freestyle without much c/o; pt remains tight haile with DF ROM in ankle; cont skilled therapy for ROM and functional strength; first steps out of bed are getting a little better       Treatment/Activity Tolerance:  [x] Patient tolerated treatment well [] Patient limited by fatique  [] Patient limited by pain  [] Patient limited by other medical complications  [] Other:     Overall Progression Towards Functional goals/ Treatment Progress Update:  [] Patient is progressing as expected towards functional goals listed. [] Progression is slowed due to complexities/Impairments listed. [] Progression has been slowed due to co-morbidities.   [x] Plan just implemented, too soon to assess goals progression <30days   [] Goals require adjustment due to lack of progress  [] Patient is not progressing as expected and requires additional follow up with physician  [] Other    Prognosis for POC: [x] Good [] Fair  [] Poor    Patient requires continued skilled intervention: [x] Yes  [] No        PLAN: Add as indicated above  [x] Continue per plan of care [] Alter current plan (see comments)  [] Plan of care initiated [] Hold pending MD visit [] Discharge    Electronically signed by: Horace Gilman, PTMPT [id number]    Note: If patient does not return for scheduled/recommended follow up visits, this note will serve as a discharge from care along with the most recent update on progress.

## 2023-02-14 ENCOUNTER — HOSPITAL ENCOUNTER (OUTPATIENT)
Dept: PHYSICAL THERAPY | Age: 58
Setting detail: THERAPIES SERIES
Discharge: HOME | End: 2023-02-14
Payer: COMMERCIAL

## 2023-02-14 PROCEDURE — 97110 THERAPEUTIC EXERCISES: CPT

## 2023-02-14 PROCEDURE — 97112 NEUROMUSCULAR REEDUCATION: CPT

## 2023-02-14 PROCEDURE — 97530 THERAPEUTIC ACTIVITIES: CPT

## 2023-02-14 NOTE — FLOWSHEET NOTE
[facility] - Outpatient Rehabilitation and Therapy, [facility]  [address] [facility], [facility]  Phone: [phone]   Fax:     [phone]      Physical Therapy Treatment Note/ Progress Report:     Date:  2023    Patient Name:  Manuelito Otoole \"Wea\"   :  1965  MRN: [id number]    Pertinent Medical History:     Medical/Treatment Diagnosis Information:  Medical Diagnosis: Closed displaced fracture of body of talus [S92.123A]  Treatment Diagnosis: pain, dec gait/ROM/strength limiting ADLs    Insurance/Certification information:  PT Insurance Information: [place] - 18 visit; end date extension 23-3/31/23  Physician Information:  Deidra Jarrett MD  Plan of care signed (Y/N): yes    Date of Patient follow up with Physician:      Progress Report: []  Yes  [x]  No     Date Range for reporting period:  Beginnin2023  Ending:      Progress report due (10 Rx/or 30 days whichever is less): 75     Recertification due (POC duration/ or 90 days whichever is less):      Visit # POC/Insurance Allowable Auth Needed     18 visits   23-3/31/23 [x]Yes   []No     Latex Allergy:  [x]NO      []YES  Preferred Language for Healthcare:   [x]English       []Other:    Functional Scale:       Date assessed: at eval  Test:WOMAC  Score:Raw score 58    Pain level:  1-2/10     History of Injury: R ankle/talus fx on 22 while stepping off pallet at work. She had ORIF on . Most pain is across top of foot ranges from 3-6/10 with c/o pain waking her at night. She is not icing, edu on benefits of ice daily; taking OTC ibuprofen; has started some light yoga/pilates and TB PF ex; denies N/T. Pt is off work right now.      SUBJECTIVE:  See eval  : just stiffness now with ibuprofen in her system; ankle circles hep is challenging, but overall feels pain intensity is getting better; to PT with 1 crutch and ace wrap on ankle 2/7: working through some AM stiffness  2/9: not much pain, more stiffness posterior ankle; pt belongs to [facility] and was able to go and swim a few laps of breaststroke and freestyle without much c/o   2/14: to PT without crutch and was able to take the steps up to PT (reciprocally); sore last night as she thinks she overdid it with swimming/yard work and trying to get back into normal ADLs, but ice and ibuprofen helped and she is only a little tender today     OBJECTIVE:  Observation:   Test measurements:      RESTRICTIONS/PRECAUTIONS:     Exercises/Interventions:     Therapeutic Ex (45370)   Min:12 Reps/Resistance Notes/CUES  R ankle talus fx/ORIF   Airex Seat 4 x 5 min  Edu for hep including: AROM, calf and inv/evr str - see below   GSS incline  Sol stretch incline  3 x 20 sec  3 x 20 sec      MWM DF stretch on step 5 x 10 sec     Standing HR X 10 R>L    Fitter  1 thin f/b s/s x 10 each     R SL squat w/ star/tap L  add                   TB 4 way    Hep - Pt has several bands at home  2/14: able to progress to next levels of bands at home         Therapeutic Activity (53575) Min:8    Step up fwd  Step down 6\" x 10  6\" x 10 [place] steps at home reciprocally - inc to 8\" if [place]    Jog on mini trampoline add    Amb in ll bars:   Line walk   Retro walk   Carioca walk    X 1 length  X 1 length  X 2 length                NMR re-education (11735)  Min:10  Cues for tech as needed    Gumdrop    roll cw/ccw   balance   Cw/ccw x 10 each   Right side up x 30 sec     Baps 4 way  L2 PWB x 10 f/b s/s  X 10 cw/ccw   Add: FWB   Bosu   Balance   Mini squat   X 30 sec  X 10    Rocker board f/b s/s X 30 sec each    Airex   Step up R   SLS   Step up R x 5  X 30 sec         Manual Intervention (56769) Min: 8     Joint mobs   TC, subtalar, MT   Gentle grd 1-2     PROM 4 way X 10 each                    Modalities  Min:x 15 min      CP  Supine w/ wedge x 15 min                 Other Therapeutic Activities: Pt was educated on PT POC, Diagnosis, Prognosis, pathomechanics as well as frequency and duration of scheduling future physical therapy appointments. Time was also taken on this day to answer all patient questions and participation in PT. Reviewed appointment policy in detail with patient and patient verbalized understanding. Home Exercise Program: Patient was instructed in the following for HEP:     . Patient verbalized/demonstrated understanding and was issued written handout. Access Code: [email or web address]  URL: [email or web address]  Date: 01/26/2023  Prepared by: Savana Hartman     Exercises  Supine Single Leg Ankle Pumps - 1-2 x daily - 7 x weekly - 1 sets - 10 reps  Supine Ankle Circles - 1-2 x daily - 7 x weekly - 1 sets - 10 reps  Supine Ankle Inversion Eversion AROM - 1-2 x daily - 7 x weekly - 1 sets - 10 reps  Seated Toe Curl - 1-2 x daily - 7 x weekly - 1 sets - 10 reps  Towel Scrunches - 1-2 x daily - 7 x weekly - 1 sets - 10 reps  Ankle Alphabet in Elevation - 1-2 x daily - 7 x weekly - 1 sets - 10 reps  Long Sitting Calf Stretch with Strap - 1 x daily - 7 x weekly - 1 sets - 3-5 reps - 10 hold  Seated Ankle Inversion Eversion PROM - 1 x daily - 7 x weekly - 3 sets - 3-5 reps - 10 hold    Access Code: [email or web address]  URL: [email or web address]  Date: 01/31/2023  Prepared by: Savana Bad    Exercises  Seated Heel Raise - 1 x daily - 7 x weekly - 1 sets - 10 reps  Seated Toe Raise - 1 x daily - 7 x weekly - 1 sets - 10 reps  Seated Ankle Inversion AROM - 1 x daily - 7 x weekly - 1 sets - 10 reps  Seated Ankle Inversion Eversion AROM - 1 x daily - 7 x weekly - 1 sets - 10 reps  Seated Heel Slide - 1 x daily - 7 x weekly - 1 sets - 10 reps    Access Code: [email or web address]  URL: [email or web address]  Date: 02/07/2023  Prepared by: Savana Bad    Exercises  Ankle Dorsiflexion with Resistance - 1 x daily - 7 x weekly - 1 sets - 10 reps  Seated Ankle Dorsiflexion with Resistance - 1 x daily - 7 x weekly - 1 sets - 10 reps  Ankle and Toe Plantarflexion with Resistance - 1 x daily - 7 x weekly - 1 sets - 10 reps  Seated Ankle Inversion with Resistance and Legs Crossed - 1 x daily - 7 x weekly - 1 sets - 10 reps  Long Sitting Ankle Eversion with Resistance - 1 x daily - 7 x weekly - 1 sets - 10 reps        Therapeutic Exercise and NMR EXR  [x] (80917) Provided verbal/tactile cueing for activities related to strengthening, flexibility, endurance, ROM for improvements in LE, proximal hip, and core control with self care, mobility, lifting, ambulation.  [] (32787) Provided verbal/tactile cueing for activities related to improving balance, coordination, kinesthetic sense, posture, motor skill, proprioception  to assist with LE, proximal hip, and core control in self care, mobility, lifting, ambulation and eccentric single leg control.  [address] and Therapeutic Activities:    [x] (57158 or 70745) Provided verbal/tactile cueing for activities related to improving balance, coordination, kinesthetic sense, posture, motor skill, proprioception and motor activation to allow for proper function of core, proximal hip and LE with self care and ADLs and functional mobility.   [] (88972) Gait Re-education- Provided training and instruction to the patient for proper LE, core and proximal hip recruitment and positioning and eccentric body weight control with ambulation re-education including up and down stairs     Home Exercise Program:    [x] (06125) Reviewed/Progressed HEP activities related to strengthening, flexibility, endurance, ROM of core, proximal hip and LE for functional self-care, mobility, lifting and ambulation/stair navigation   [] (42304)Reviewed/Progressed HEP activities related to improving balance, coordination, kinesthetic sense, posture, motor skill, proprioception of core, proximal hip and LE for self care, mobility, lifting, and ambulation/stair navigation      Manual Treatments:  PROM / STM / Oscillations-Mobs:  G-I, II, III, IV (Marcy, Inf., Post.)  [x] (96398) Provided manual therapy to mobilize LE, proximal hip and/or LS spine soft tissue/joints for the purpose of modulating pain, promoting relaxation,  increasing ROM, reducing/eliminating soft tissue swelling/inflammation/restriction, improving soft tissue extensibility and allowing for proper ROM for normal function with self care, mobility, lifting and ambulation. If BWC Please Indicate Time In/Out  CPT Code Time in Time out   Eval     Ex 9:00 9:12   NMR 9:12 9:22   TA 9:22 9:30   Man 9:30 9:38   CP 9:38 9:53       Charges:  Timed Code Treatment Minutes: 38   Total Treatment Minutes: 53      [] EVAL (LOW) 55400 (typically 20 minutes face-to-face)  [] EVAL (MOD) 80209 (typically 30 minutes face-to-face)  [] EVAL (HIGH) 70381 (typically 45 minutes face-to-face)  [] RE-EVAL     [x] JL(75485) x     [] Dry needle 1 or 2 Muscles (09925)  [x] NMR (84372) x     [] Dry needle 3+ Muscles (53122)  [] Manual (07921) x     [] Ultrasound (11654) x  [x] TA (13630) x     [] Mech Traction (36651)  [] ES(attended) (31755)     [] ES (un) (22 679808):   [] Vasopump (46602) [] Ionto (46022)   [] Other:    GOALS:  Patient stated goal: \" walk without brace\"  [] Progressing: [] Met: [] Not Met: [] Adjusted     Therapist goals for Patient:   Short Term Goals: To be achieved in: 2 weeks  1. Independent in HEP and progression per patient tolerance, in order to prevent re-injury. [] Progressing: [] Met: [] Not Met: [] Adjusted  2. Patient will have a decrease in pain by 20-30% to facilitate improvement in movement, function, and ADLs as indicated by Functional Deficits. [] Progressing: [] Met: [] Not Met: [] Adjusted     Long Term Goals: To be achieved in: at d/c   1. Improve WOMAC functional outcome score fby 10 points or more to assist with reaching prior level of function. [] Progressing: [] Met: [] Not Met: [] Adjusted  2.  Patient will have a decrease in pain by 40-50% to facilitate improvement in movement, function, and ADLs as indicated by Functional Deficits. [] Progressing: [] Met: [] Not Met: [] Adjusted  3. Patient will demonstrate increased AROM to +5 DF, 35 inv, 25 evr to allow for proper joint functioning as indicated by patients Functional Deficits. [] Progressing: [] Met: [] Not Met: [] Adjusted  4. Patient will demonstrate an increase in Strength to 5/5 in R ankle/LE to allow for proper functional mobility as indicated by patients Functional Deficits. [] Progressing: [] Met: [] Not Met: [] Adjusted  5. Patient will return to amb without brace/AD without increased symptoms or restriction. [] Progressing: [] Met: [] Not Met: [] Adjusted  6. \"Work and sports\"(patient specific functional goal)    [] Progressing: [] Met: [] Not Met: [] Adjusted         ASSESSMENT:  1/26: pt's s/s consistent with post surgical status R foot/ankle; skilled PT necessary for ROM/strength/gait  1/31: skilled PT to gradually progress WB, ROM, balance and strength; [place] new ex well   2/7: tolerated WB progression well today with skilled PT/supervision    2/9: pt belongs to [facility] and was able to go and swim a few laps of breaststroke and freestyle without much c/o; pt remains tight haile with DF ROM in ankle; cont skilled therapy for ROM and functional strength; first steps out of bed are getting a little better     2/14: gradually returning to normal ADLs and inc activity levels well; cont skilled therapy to progress functional strength also    Treatment/Activity Tolerance:  [x] Patient tolerated treatment well [] Patient limited by fatique  [] Patient limited by pain  [] Patient limited by other medical complications  [] Other:     Overall Progression Towards Functional goals/ Treatment Progress Update:  [x] Patient is progressing as expected towards functional goals listed. [] Progression is slowed due to complexities/Impairments listed. [] Progression has been slowed due to co-morbidities.   [] Plan just implemented, too soon to assess goals progression <30days   [] Goals require adjustment due to lack of progress  [] Patient is not progressing as expected and requires additional follow up with physician  [] Other    Prognosis for POC: [x] Good [] Fair  [] Poor    Patient requires continued skilled intervention: [x] Yes  [] No        PLAN: Add as indicated above  [x] Continue per plan of care [] Alter current plan (see comments)  [] Plan of care initiated [] Hold pending MD visit [] Discharge    Electronically signed by: Talya Haddad, PTMPT [id number]    Note: If patient does not return for scheduled/recommended follow up visits, this note will serve as a discharge from care along with the most recent update on progress.

## 2023-02-16 ENCOUNTER — HOSPITAL ENCOUNTER (OUTPATIENT)
Dept: PHYSICAL THERAPY | Age: 58
Setting detail: THERAPIES SERIES
Discharge: HOME | End: 2023-02-16
Payer: COMMERCIAL

## 2023-02-16 PROCEDURE — 97112 NEUROMUSCULAR REEDUCATION: CPT

## 2023-02-16 PROCEDURE — 97530 THERAPEUTIC ACTIVITIES: CPT

## 2023-02-16 PROCEDURE — 97110 THERAPEUTIC EXERCISES: CPT

## 2023-02-16 NOTE — FLOWSHEET NOTE
[facility] - Outpatient Rehabilitation and Therapy, [facility]  [address] [facility], [facility]  Phone: [phone]   Fax:     [phone]      Physical Therapy Treatment Note/ Progress Report:     Date:  2023    Patient Name:  Otoniel Andre \"Ewa\"   :  1965  MRN: [id number]    Pertinent Medical History:     Medical/Treatment Diagnosis Information:  Medical Diagnosis: Closed displaced fracture of body of talus [S92.123A]  Treatment Diagnosis: pain, dec gait/ROM/strength limiting ADLs    Insurance/Certification information:  PT Insurance Information: [facility] - 18 visit; end date extension 23-3/31/23  Physician Information:  Aquilino Rodriguez MD  Plan of care signed (Y/N): yes    Date of Patient follow up with Physician:      Progress Report: []  Yes  [x]  No     Date Range for reporting period:  Beginnin2023  Ending:      Progress report due (10 Rx/or 30 days whichever is less):      Recertification due (POC duration/ or 90 days whichever is less):      Visit # POC/Insurance Allowable Auth Needed     18 visits   23-3/31/23 [x]Yes   []No     Latex Allergy:  [x]NO      []YES  Preferred Language for Healthcare:   [x]English       []Other:    Functional Scale:       Date assessed: at eval  Test:WOMAC  Score:Raw score 58    Pain level:  1-2/10     History of Injury: R ankle/talus fx on 22 while stepping off pallet at work. She had ORIF on . Most pain is across top of foot ranges from 3-6/10 with c/o pain waking her at night. She is not icing, edu on benefits of ice daily; taking OTC ibuprofen; has started some light yoga/pilates and TB PF ex; denies N/T. Pt is off work right now.      SUBJECTIVE:  See eval  : just stiffness now with ibuprofen in her system; ankle circles hep is challenging, but overall feels pain intensity is getting better; to PT with 1 crutch and ace wrap on ankle 2/7: working through some AM stiffness  2/9: not much pain, more stiffness posterior ankle; pt belongs to [facility] and was able to go and swim a few laps of breaststroke and freestyle without much c/o   2/14: to PT without crutch and was able to take the steps up to PT (reciprocally); sore last night as she thinks she overdid it with swimming/yard work and trying to get back into normal ADLs, but ice and ibuprofen helped and she is only a little tender today   2/16: did a lot of yard work the past 2 days, pain is low but she can tell her ankle is more swollen      OBJECTIVE:  Observation:   Test measurements:      RESTRICTIONS/PRECAUTIONS:     Exercises/Interventions:     Therapeutic Ex (79427)   Min:15 Reps/Resistance Notes/CUES  R ankle talus fx/ORIF   Airex Seat 4 x 5 min  Edu for hep including: AROM, calf and inv/evr str - see below   GSS incline  Sol stretch incline  3 x 20 sec  3 x 20 sec      MWM DF stretch on step 5 x 10 sec     Standing HR X 10 R>L    Fitter  1 thin f/b s/s x 10 each     R SL squat w/ star/tap L  add         Leg press    R SL press   R SL HR   30# 1 x 10   30# 1 x 10          TB 4 way    Hep - Pt has several bands at home  2/14: able to progress to next levels of bands at home         Therapeutic Activity (05050) Min:10    Step up fwd  Step down 6\" x 10  6\" x 10 [place] steps at home reciprocally - inc to 8\" if [place]    Jog on mini trampoline add    Amb in ll bars:   Line walk   Retro walk   Carioca walk    X 1 length  X 1 length  X 2 lengths                NMR re-education (74920)  Min:12  Cues for tech as needed    Gumdrop    roll cw/ccw   balance   Cw/ccw x 10 each   Right side up x 30 sec     Baps 4 way  L2 PWB x 10 f/b s/s  X 10 cw/ccw   Add: FWB   Bosu   Balance   Mini squat   X 30 sec  X 10    Rocker board f/b s/s X 30 sec each    Airex   Step up R   SLS   Step up R x 5  X 30 sec         Manual Intervention (86366) Min: 8     Joint mobs   TC, subtalar, MT   Gentle grd 1-2     PROM 4 way X 10 each                    Modalities  Min:x 15 min      CP  Supine w/ wedge x 15 min                 Other Therapeutic Activities: Pt was educated on PT POC, Diagnosis, Prognosis, pathomechanics as well as frequency and duration of scheduling future physical therapy appointments. Time was also taken on this day to answer all patient questions and participation in PT. Reviewed appointment policy in detail with patient and patient verbalized understanding. Home Exercise Program: Patient was instructed in the following for HEP:     . Patient verbalized/demonstrated understanding and was issued written handout. Access Code: [email or web address]  URL: [email or web address]  Date: 01/26/2023  Prepared by: Av Dow     Exercises  Supine Single Leg Ankle Pumps - 1-2 x daily - 7 x weekly - 1 sets - 10 reps  Supine Ankle Circles - 1-2 x daily - 7 x weekly - 1 sets - 10 reps  Supine Ankle Inversion Eversion AROM - 1-2 x daily - 7 x weekly - 1 sets - 10 reps  Seated Toe Curl - 1-2 x daily - 7 x weekly - 1 sets - 10 reps  Towel Scrunches - 1-2 x daily - 7 x weekly - 1 sets - 10 reps  Ankle Alphabet in Elevation - 1-2 x daily - 7 x weekly - 1 sets - 10 reps  Long Sitting Calf Stretch with Strap - 1 x daily - 7 x weekly - 1 sets - 3-5 reps - 10 hold  Seated Ankle Inversion Eversion PROM - 1 x daily - 7 x weekly - 3 sets - 3-5 reps - 10 hold    Access Code: [email or web address]  URL: [email or web address]  Date: 01/31/2023  Prepared by: Av Dow    Exercises  Seated Heel Raise - 1 x daily - 7 x weekly - 1 sets - 10 reps  Seated Toe Raise - 1 x daily - 7 x weekly - 1 sets - 10 reps  Seated Ankle Inversion AROM - 1 x daily - 7 x weekly - 1 sets - 10 reps  Seated Ankle Inversion Eversion AROM - 1 x daily - 7 x weekly - 1 sets - 10 reps  Seated Heel Slide - 1 x daily - 7 x weekly - 1 sets - 10 reps    Access Code: [email or web address]  URL: [email or web address]  Date: 02/07/2023  Prepared by: Av Dow    Exercises  Ankle Dorsiflexion with Resistance - 1 x daily - 7 x weekly - 1 sets - 10 reps  Seated Ankle Dorsiflexion with Resistance - 1 x daily - 7 x weekly - 1 sets - 10 reps  Ankle and Toe Plantarflexion with Resistance - 1 x daily - 7 x weekly - 1 sets - 10 reps  Seated Ankle Inversion with Resistance and Legs Crossed - 1 x daily - 7 x weekly - 1 sets - 10 reps  Long Sitting Ankle Eversion with Resistance - 1 x daily - 7 x weekly - 1 sets - 10 reps        Therapeutic Exercise and NMR EXR  [x] (91044) Provided verbal/tactile cueing for activities related to strengthening, flexibility, endurance, ROM for improvements in LE, proximal hip, and core control with self care, mobility, lifting, ambulation.  [] (95731) Provided verbal/tactile cueing for activities related to improving balance, coordination, kinesthetic sense, posture, motor skill, proprioception  to assist with LE, proximal hip, and core control in self care, mobility, lifting, ambulation and eccentric single leg control.  [address] and Therapeutic Activities:    [x] (64920 or 71109) Provided verbal/tactile cueing for activities related to improving balance, coordination, kinesthetic sense, posture, motor skill, proprioception and motor activation to allow for proper function of core, proximal hip and LE with self care and ADLs and functional mobility.   [] (76798) Gait Re-education- Provided training and instruction to the patient for proper LE, core and proximal hip recruitment and positioning and eccentric body weight control with ambulation re-education including up and down stairs     Home Exercise Program:    [x] (52521) Reviewed/Progressed HEP activities related to strengthening, flexibility, endurance, ROM of core, proximal hip and LE for functional self-care, mobility, lifting and ambulation/stair navigation   [] (55973)Reviewed/Progressed HEP activities related to improving balance, coordination, kinesthetic sense, posture, motor skill, proprioception of core, proximal hip and LE for self care, mobility, lifting, and ambulation/stair navigation      Manual Treatments:  PROM / STM / Oscillations-Mobs:  G-I, II, III, IV (PA's, Inf., Post.)  [x] (03032) Provided manual therapy to mobilize LE, proximal hip and/or LS spine soft tissue/joints for the purpose of modulating pain, promoting relaxation,  increasing ROM, reducing/eliminating soft tissue swelling/inflammation/restriction, improving soft tissue extensibility and allowing for proper ROM for normal function with self care, mobility, lifting and ambulation. If BWC Please Indicate Time In/Out  CPT Code Time in Time out   Eval     Ex 9:00 9:15   NMR 9:15 9:27   TA 9:27 9:37   Man 9:37 9:45   CP 9:45 10:00       Charges:  Timed Code Treatment Minutes: 45   Total Treatment Minutes: 60      [] EVAL (LOW) 12983 (typically 20 minutes face-to-face)  [] EVAL (MOD) 44074 (typically 30 minutes face-to-face)  [] EVAL (HIGH) 24077 (typically 45 minutes face-to-face)  [] RE-EVAL     [x] AY(76346) x     [] Dry needle 1 or 2 Muscles (77824)  [x] NMR (01765) x     [] Dry needle 3+ Muscles (71615)  [] Manual (05836) x     [] Ultrasound (63976) x  [x] TA (24991) x     [] Mech Traction (55673)  [] ES(attended) (64607)     [] ES (un) (09221):   [] Vasopump (09474) [] Ionto (73885)   [] Other:    GOALS:  Patient stated goal: \" walk without brace\"  [] Progressing: [] Met: [] Not Met: [] Adjusted     Therapist goals for Patient:   Short Term Goals: To be achieved in: 2 weeks  1. Independent in HEP and progression per patient tolerance, in order to prevent re-injury. [] Progressing: [] Met: [] Not Met: [] Adjusted  2. Patient will have a decrease in pain by 20-30% to facilitate improvement in movement, function, and ADLs as indicated by Functional Deficits. [] Progressing: [] Met: [] Not Met: [] Adjusted     Long Term Goals: To be achieved in: at d/c   1.  Improve WOMAC functional outcome score fby 10 points or more to assist with reaching prior level of function. [] Progressing: [] Met: [] Not Met: [] Adjusted  2. Patient will have a decrease in pain by 40-50% to facilitate improvement in movement, function, and ADLs as indicated by Functional Deficits. [] Progressing: [] Met: [] Not Met: [] Adjusted  3. Patient will demonstrate increased AROM to +5 DF, 35 inv, 25 evr to allow for proper joint functioning as indicated by patients Functional Deficits. [] Progressing: [] Met: [] Not Met: [] Adjusted  4. Patient will demonstrate an increase in Strength to 5/5 in R ankle/LE to allow for proper functional mobility as indicated by patients Functional Deficits. [] Progressing: [] Met: [] Not Met: [] Adjusted  5. Patient will return to amb without brace/AD without increased symptoms or restriction. [] Progressing: [] Met: [] Not Met: [] Adjusted  6.  \"Work and sports\"(patient specific functional goal)    [] Progressing: [] Met: [] Not Met: [] Adjusted         ASSESSMENT:  1/26: pt's s/s consistent with post surgical status R foot/ankle; skilled PT necessary for ROM/strength/gait  1/31: skilled PT to gradually progress WB, ROM, balance and strength; [place] new ex well   2/7: tolerated WB progression well today with skilled PT/supervision    2/9: pt belongs to [facility] and was able to go and swim a few laps of breaststroke and freestyle without much c/o; pt remains tight haile with DF ROM in ankle; cont skilled therapy for ROM and functional strength; first steps out of bed are getting a little better     2/14: gradually returning to normal ADLs and inc activity levels well; cont skilled therapy to progress functional strength also    Treatment/Activity Tolerance:  [x] Patient tolerated treatment well [] Patient limited by fatique  [] Patient limited by pain  [] Patient limited by other medical complications  [] Other:     Overall Progression Towards Functional goals/ Treatment Progress Update:  [x] Patient is progressing as expected towards functional goals listed. [] Progression is slowed due to complexities/Impairments listed. [] Progression has been slowed due to co-morbidities. [] Plan just implemented, too soon to assess goals progression <30days   [] Goals require adjustment due to lack of progress  [] Patient is not progressing as expected and requires additional follow up with physician  [] Other    Prognosis for POC: [x] Good [] Fair  [] Poor    Patient requires continued skilled intervention: [x] Yes  [] No        PLAN: Add as indicated above  [x] Continue per plan of care [] Alter current plan (see comments)  [] Plan of care initiated [] Hold pending MD visit [] Discharge    Electronically signed by: JONAS ColesPT [id number]    Note: If patient does not return for scheduled/recommended follow up visits, this note will serve as a discharge from care along with the most recent update on progress.

## 2023-02-21 ENCOUNTER — HOSPITAL ENCOUNTER (OUTPATIENT)
Dept: PHYSICAL THERAPY | Age: 58
Setting detail: THERAPIES SERIES
Discharge: HOME | End: 2023-02-21
Payer: COMMERCIAL

## 2023-02-21 PROCEDURE — 97110 THERAPEUTIC EXERCISES: CPT

## 2023-02-21 PROCEDURE — 97112 NEUROMUSCULAR REEDUCATION: CPT

## 2023-02-21 NOTE — FLOWSHEET NOTE
[facility] - Outpatient Rehabilitation and Therapy, [facility]  [address] [facility], [facility]  Phone: [phone]   Fax:     [phone]      Physical Therapy Treatment Note/ Progress Report:     Date:  2023    Patient Name:  Chris Bay \"Ewa\"   :  1965  MRN: [id number]    Pertinent Medical History:     Medical/Treatment Diagnosis Information:  Medical Diagnosis: Closed displaced fracture of body of talus [S92.123A]  Treatment Diagnosis: pain, dec gait/ROM/strength limiting ADLs    Insurance/Certification information:  PT Insurance Information: [facility] - 18 visit; end date extension 23-3/31/23  Physician Information:  Jesus Morales MD  Plan of care signed (Y/N): yes    Date of Patient follow up with Physician: 23     Progress Report: []  Yes  [x]  No     Date Range for reporting period:  Beginnin2023  Ending:      Progress report due (10 Rx/or 30 days whichever is less): 3/94/31     Recertification due (POC duration/ or 90 days whichever is less):      Visit # POC/Insurance Allowable Auth Needed     18 visits   23-3/31/23 [x]Yes   []No     Latex Allergy:  [x]NO      []YES  Preferred Language for Healthcare:   [x]English       []Other:    Functional Scale:       Date assessed: at eval  Test:WOMAC  Score:Raw score 58    Pain level:  1-2/10     History of Injury: R ankle/talus fx on 22 while stepping off pallet at work. She had ORIF on . Most pain is across top of foot ranges from 3-6/10 with c/o pain waking her at night. She is not icing, edu on benefits of ice daily; taking OTC ibuprofen; has started some light yoga/pilates and TB PF ex; denies N/T. Pt is off work right now.      SUBJECTIVE:  See eval  : just stiffness now with ibuprofen in her system; ankle circles hep is challenging, but overall feels pain intensity is getting better; to PT with 1 crutch and ace wrap on ankle   2/7: working through some AM stiffness  2/9: not much pain, more stiffness posterior ankle; pt belongs to [facility] and was able to go and swim a few laps of breaststroke and freestyle without much c/o   2/14: to PT without crutch and was able to take the steps up to PT (reciprocally); sore last night as she thinks she overdid it with swimming/yard work and trying to get back into normal ADLs, but ice and ibuprofen helped and she is only a little tender today   2/16: did a lot of yard work the past 2 days, pain is low but she can tell her ankle is more swollen  2/21: just annoying stiffness; tolerated a 1 hour outdoor bike ride on Sunday without c/o; gradually resuming gym ex and was able to do lunges without c/o; to PT with ankle ACE wrapped and states only wraps it in the AM  (some ex modified/held due to pt arrived 7 min late to session)       OBJECTIVE:  Observation:   Test measurements:      RESTRICTIONS/PRECAUTIONS:     Exercises/Interventions:     Therapeutic Ex (44918)   Min:10 Reps/Resistance Notes/CUES  R ankle talus fx/ORIF   Airex Seat 4 x 5 min  Edu for hep including: AROM, calf and inv/evr str - see below   GSS incline  Sol stretch incline  3 x 20 sec  3 x 20 sec      MWM DF stretch on step    Standing HR hep   Fitter  1 thin f/b s/s x 10 each     R SL squat w/ star/tap L  add         Leg press    R SL press   R SL HR   30# 1 x 10   30# 1 x 10          TB 4 way    Hep - Pt has several bands at home  2/14: able to progress to next levels of bands at home         Therapeutic Activity (72210) Min:5    Step up fwd  Step down 8\" x 10  8\" x 10    Jog on mini trampoline X 30 sec     Amb in ll bars:   Line walk   Retro walk   Carioca walk    X 2 lengths                NMR re-education (59681)  Min:10  Cues for tech as needed    Gumdrop    roll cw/ccw   balance      upside up x 30 sec     Baps 4 way  L2 FWB x 10 cw/ccw      Bosu   Balance   Mini squat   X 30 sec  X 10    Rocker board f/b s/s    Airex   Step up R   SLS   Step up R x 5  X 30 sec         Manual Intervention (23778) Min: 5     Joint mobs   TC, subtalar, MT   Gentle grd 1-2     PROM 4 way X 10 each                    Modalities  Min:x 15 min      CP  Supine w/ wedge x 15 min                 Other Therapeutic Activities: Pt was educated on PT POC, Diagnosis, Prognosis, pathomechanics as well as frequency and duration of scheduling future physical therapy appointments. Time was also taken on this day to answer all patient questions and participation in PT. Reviewed appointment policy in detail with patient and patient verbalized understanding. Home Exercise Program: Patient was instructed in the following for HEP:     . Patient verbalized/demonstrated understanding and was issued written handout. Access Code: [email or web address]  URL: [employer]/  Date: 01/26/2023  Prepared by: Mundo Husbands     Exercises  Supine Single Leg Ankle Pumps - 1-2 x daily - 7 x weekly - 1 sets - 10 reps  Supine Ankle Circles - 1-2 x daily - 7 x weekly - 1 sets - 10 reps  Supine Ankle Inversion Eversion AROM - 1-2 x daily - 7 x weekly - 1 sets - 10 reps  Seated Toe Curl - 1-2 x daily - 7 x weekly - 1 sets - 10 reps  Towel Scrunches - 1-2 x daily - 7 x weekly - 1 sets - 10 reps  Ankle Alphabet in Elevation - 1-2 x daily - 7 x weekly - 1 sets - 10 reps  Long Sitting Calf Stretch with Strap - 1 x daily - 7 x weekly - 1 sets - 3-5 reps - 10 hold  Seated Ankle Inversion Eversion PROM - 1 x daily - 7 x weekly - 3 sets - 3-5 reps - 10 hold    Access Code: [email or web address]  URL: [email or web address]  Date: 01/31/2023  Prepared by: Mundo Husbands    Exercises  Seated Heel Raise - 1 x daily - 7 x weekly - 1 sets - 10 reps  Seated Toe Raise - 1 x daily - 7 x weekly - 1 sets - 10 reps  Seated Ankle Inversion AROM - 1 x daily - 7 x weekly - 1 sets - 10 reps  Seated Ankle Inversion Eversion AROM - 1 x daily - 7 x weekly - 1 sets - 10 reps  Seated Heel Slide - 1 x daily - 7 x weekly - 1 sets - 10 reps    Access Code: [email or web address]  URL: [email or web address]/  Date: 02/07/2023  Prepared by: Stacey Frias    Exercises  Ankle Dorsiflexion with Resistance - 1 x daily - 7 x weekly - 1 sets - 10 reps  Seated Ankle Dorsiflexion with Resistance - 1 x daily - 7 x weekly - 1 sets - 10 reps  Ankle and Toe Plantarflexion with Resistance - 1 x daily - 7 x weekly - 1 sets - 10 reps  Seated Ankle Inversion with Resistance and Legs Crossed - 1 x daily - 7 x weekly - 1 sets - 10 reps  Long Sitting Ankle Eversion with Resistance - 1 x daily - 7 x weekly - 1 sets - 10 reps        Therapeutic Exercise and NMR EXR  [x] (49281) Provided verbal/tactile cueing for activities related to strengthening, flexibility, endurance, ROM for improvements in LE, proximal hip, and core control with self care, mobility, lifting, ambulation.  [] (09317) Provided verbal/tactile cueing for activities related to improving balance, coordination, kinesthetic sense, posture, motor skill, proprioception  to assist with LE, proximal hip, and core control in self care, mobility, lifting, ambulation and eccentric single leg control.  [address] and Therapeutic Activities:    [x] (75516 or 02149) Provided verbal/tactile cueing for activities related to improving balance, coordination, kinesthetic sense, posture, motor skill, proprioception and motor activation to allow for proper function of core, proximal hip and LE with self care and ADLs and functional mobility.   [] (28298) Gait Re-education- Provided training and instruction to the patient for proper LE, core and proximal hip recruitment and positioning and eccentric body weight control with ambulation re-education including up and down stairs     Home Exercise Program:    [x] (37754) Reviewed/Progressed HEP activities related to strengthening, flexibility, endurance, ROM of core, proximal hip and LE for functional self-care, mobility, lifting and ambulation/stair navigation   [] (07905)Reviewed/Progressed HEP activities related to improving balance, coordination, kinesthetic sense, posture, motor skill, proprioception of core, proximal hip and LE for self care, mobility, lifting, and ambulation/stair navigation      Manual Treatments:  PROM / STM / Oscillations-Mobs:  G-I, II, III, IV (PA's, Inf., Post.)  [x] (84731) Provided manual therapy to mobilize LE, proximal hip and/or LS spine soft tissue/joints for the purpose of modulating pain, promoting relaxation,  increasing ROM, reducing/eliminating soft tissue swelling/inflammation/restriction, improving soft tissue extensibility and allowing for proper ROM for normal function with self care, mobility, lifting and ambulation. If BWC Please Indicate Time In/Out  CPT Code Time in Time out   Eval     Ex 10:37 10:47   NMR 10:47 10:57   TA 10:57 11:02   Man 11:02 11:07   CP 11:07 11:22       Charges:  Timed Code Treatment Minutes: 30   Total Treatment Minutes: 45      [] EVAL (LOW) 88943 (typically 20 minutes face-to-face)  [] EVAL (MOD) 11450 (typically 30 minutes face-to-face)  [] EVAL (HIGH) 98069 (typically 45 minutes face-to-face)  [] RE-EVAL     [x] SB(11178) x     [] Dry needle 1 or 2 Muscles (67752)  [x] NMR (66390) x     [] Dry needle 3+ Muscles (29692)  [] Manual (24343) x     [] Ultrasound (11836) x  [] TA (29607) x     [] Mech Traction (67494)  [] ES(attended) (08803)     [] ES (un) (13404):   [] Vasopump (98625) [] Ionto (56355)   [] Other:    GOALS:  Patient stated goal: \" walk without brace\"  [] Progressing: [] Met: [] Not Met: [] Adjusted     Therapist goals for Patient:   Short Term Goals: To be achieved in: 2 weeks  1. Independent in HEP and progression per patient tolerance, in order to prevent re-injury. [] Progressing: [] Met: [] Not Met: [] Adjusted  2. Patient will have a decrease in pain by 20-30% to facilitate improvement in movement, function, and ADLs as indicated by Functional Deficits.   [] Progressing: [] Met: [] Not Met: [] Adjusted     Long Term Goals: To be achieved in: at d/c   1. Improve WOMAC functional outcome score fby 10 points or more to assist with reaching prior level of function. [] Progressing: [] Met: [] Not Met: [] Adjusted  2. Patient will have a decrease in pain by 40-50% to facilitate improvement in movement, function, and ADLs as indicated by Functional Deficits. [] Progressing: [] Met: [] Not Met: [] Adjusted  3. Patient will demonstrate increased AROM to +5 DF, 35 inv, 25 evr to allow for proper joint functioning as indicated by patients Functional Deficits. [] Progressing: [] Met: [] Not Met: [] Adjusted  4. Patient will demonstrate an increase in Strength to 5/5 in R ankle/LE to allow for proper functional mobility as indicated by patients Functional Deficits. [] Progressing: [] Met: [] Not Met: [] Adjusted  5. Patient will return to amb without brace/AD without increased symptoms or restriction. [] Progressing: [] Met: [] Not Met: [] Adjusted  6.  \"Work and sports\"(patient specific functional goal)    [] Progressing: [] Met: [] Not Met: [] Adjusted         ASSESSMENT:  1/26: pt's s/s consistent with post surgical status R foot/ankle; skilled PT necessary for ROM/strength/gait  1/31: skilled PT to gradually progress WB, ROM, balance and strength; [place] new ex well   2/7: tolerated WB progression well today with skilled PT/supervision    2/9: pt belongs to [facility] and was able to go and swim a few laps of breaststroke and freestyle without much c/o; pt remains tight haile with DF ROM in ankle; cont skilled therapy for ROM and functional strength; first steps out of bed are getting a little better     2/14: gradually returning to normal ADLs and inc activity levels well; cont skilled therapy to progress functional strength also  2/21: tolerating progression of PT ex and gradual return to activity very well     Treatment/Activity Tolerance:  [x] Patient tolerated treatment well [] Patient limited by tae  [] Patient limited by pain  [] Patient limited by other medical complications  [] Other:     Overall Progression Towards Functional goals/ Treatment Progress Update:  [x] Patient is progressing as expected towards functional goals listed.    [] Progression is slowed due to complexities/Impairments listed.  [] Progression has been slowed due to co-morbidities.  [] Plan just implemented, too soon to assess goals progression <30days   [] Goals require adjustment due to lack of progress  [] Patient is not progressing as expected and requires additional follow up with physician  [] Other    Prognosis for POC: [x] Good [] Fair  [] Poor    Patient requires continued skilled intervention: [x] Yes  [] No        PLAN: Add as indicated above  [x] Continue per plan of care [] Alter current plan (see comments)  [] Plan of care initiated [] Hold pending MD visit [] Discharge    Electronically signed by: Zaria Hernández, PTMPT [id number]    Note: If patient does not return for scheduled/recommended follow up visits, this note will serve as a discharge from care along with the most recent update on progress.

## 2023-02-23 ENCOUNTER — HOSPITAL ENCOUNTER (OUTPATIENT)
Dept: PHYSICAL THERAPY | Age: 58
Setting detail: THERAPIES SERIES
Discharge: HOME | End: 2023-02-23
Payer: COMMERCIAL

## 2023-02-23 PROCEDURE — 97530 THERAPEUTIC ACTIVITIES: CPT

## 2023-02-23 PROCEDURE — 97110 THERAPEUTIC EXERCISES: CPT

## 2023-02-23 PROCEDURE — 97112 NEUROMUSCULAR REEDUCATION: CPT

## 2023-02-23 NOTE — FLOWSHEET NOTE
[facility] - Outpatient Rehabilitation and Therapy, [facility]  [address] [facility], [facility]  Phone: [phone]   Fax:     [phone]      Physical Therapy Treatment Note/ Progress Report:     Date:  2023    Patient Name:  Ulysses Dia \"Ewa\"   :  1965  MRN: [id number]    Pertinent Medical History:     Medical/Treatment Diagnosis Information:  Medical Diagnosis: Closed displaced fracture of body of talus [S92.123A]  Treatment Diagnosis: pain, dec gait/ROM/strength limiting ADLs    Insurance/Certification information:  PT Insurance Information: [facility] - 18 visit; end date extension 23-3/31/23  Physician Information:  Jacobo Amaya MD  Plan of care signed (Y/N): yes    Date of Patient follow up with Physician: 23     Progress Report: []  Yes  [x]  No     Date Range for reporting period:  Beginnin2023  Ending:      Progress report due (10 Rx/or 30 days whichever is less):      Recertification due (POC duration/ or 90 days whichever is less):      Visit # POC/Insurance Allowable Auth Needed     18 visits   23-3/31/23 [x]Yes   []No     Latex Allergy:  [x]NO      []YES  Preferred Language for Healthcare:   [x]English       []Other:    Functional Scale:       Date assessed: at eval  Test:WOMAC  Score:Raw score 58    Pain level:  0/10     History of Injury: R ankle/talus fx on 22 while stepping off pallet at work. She had ORIF on . Most pain is across top of foot ranges from 3-6/10 with c/o pain waking her at night. She is not icing, edu on benefits of ice daily; taking OTC ibuprofen; has started some light yoga/pilates and TB PF ex; denies N/T. Pt is off work right now.      SUBJECTIVE:  See eval  : just stiffness now with ibuprofen in her system; ankle circles hep is challenging, but overall feels pain intensity is getting better; to PT with 1 crutch and ace wrap on ankle   2/7: working through some AM stiffness  2/9: not much pain, more stiffness posterior ankle; pt belongs to [facility] and was able to go and swim a few laps of breaststroke and freestyle without much c/o   2/14: to PT without crutch and was able to take the steps up to PT (reciprocally); sore last night as she thinks she overdid it with swimming/yard work and trying to get back into normal ADLs, but ice and ibuprofen helped and she is only a little tender today   2/16: did a lot of yard work the past 2 days, pain is low but she can tell her ankle is more swollen  2/21: just annoying stiffness; tolerated a 1 hour outdoor bike ride on Sunday without c/o; gradually resuming gym ex and was able to do lunges without c/o; to PT with ankle ACE wrapped and states only wraps it in the AM  (some ex modified/held due to pt arrived 7 min late to session)   2/23: able to get up early and do a little ex; to PT without ACE wrap; night pain is less but still needs OTC ibuprofen first thing in AM but doesn't take it consistently throughout the day; plans to do another bike ride today        OBJECTIVE:  Observation:   Test measurements:      RESTRICTIONS/PRECAUTIONS:     Exercises/Interventions:     Therapeutic Ex (28777)   Min:20 Reps/Resistance Notes/CUES  R ankle talus fx/ORIF   Airdyne Seat 4 x 5 min  Edu for hep including: AROM, calf and inv/evr str - see below   GSS incline  Sol stretch incline  3 x 20 sec  3 x 20 sec      MWM DF stretch on step 5 x 10 sec  Improved ROM   Standing HR hep   Fitter  1 thin f/b s/s x 10 each     R SL squat w/ star/tap L  F/b x 2 reps          Leg press    R SL press   R SL HR   30# 2 x 10   30# 2 x 10          TB 4 way    Hep - Pt has several bands at home  2/14: able to progress to next levels of bands at home         Therapeutic Activity (84542) Min:10    Step up fwd  Step down 8\" x 10  8\" x 10    Jog on mini trampoline X 30 sec     Amb in ll bars:   Carioca walk    X 4 lengths NMR re-education (14611)  Min:10  Cues for tech as needed    Gumdrop    roll cw/ccw   balance      upside up x 30 sec     Baps 4 way  L2 FWB x 10 cw/ccw      Bosu   Balance   Mini squat   X 30 sec  X 10    Rocker board f/b s/s    Airex   Step up R   SLS   Step up R x 5  X 30 sec         Manual Intervention (66207) Min: 5     Joint mobs   TC, subtalar, MT   Gentle grd 1-2     PROM 4 way X 10 each                    Modalities  Min:x 15 min      CP  Supine w/ wedge x 15 min                 Other Therapeutic Activities: Pt was educated on PT POC, Diagnosis, Prognosis, pathomechanics as well as frequency and duration of scheduling future physical therapy appointments. Time was also taken on this day to answer all patient questions and participation in PT. Reviewed appointment policy in detail with patient and patient verbalized understanding. Home Exercise Program: Patient was instructed in the following for HEP:     . Patient verbalized/demonstrated understanding and was issued written handout. Access Code: [email or web address]  URL: [employer]/  Date: 01/26/2023  Prepared by: Patricia Oration     Exercises  Supine Single Leg Ankle Pumps - 1-2 x daily - 7 x weekly - 1 sets - 10 reps  Supine Ankle Circles - 1-2 x daily - 7 x weekly - 1 sets - 10 reps  Supine Ankle Inversion Eversion AROM - 1-2 x daily - 7 x weekly - 1 sets - 10 reps  Seated Toe Curl - 1-2 x daily - 7 x weekly - 1 sets - 10 reps  Towel Scrunches - 1-2 x daily - 7 x weekly - 1 sets - 10 reps  Ankle Alphabet in Elevation - 1-2 x daily - 7 x weekly - 1 sets - 10 reps  Long Sitting Calf Stretch with Strap - 1 x daily - 7 x weekly - 1 sets - 3-5 reps - 10 hold  Seated Ankle Inversion Eversion PROM - 1 x daily - 7 x weekly - 3 sets - 3-5 reps - 10 hold    Access Code: [email or web address]  URL: [employer]/  Date: 01/31/2023  Prepared by: Patricia Oration    Exercises  Seated Heel Raise - 1 x daily - 7 x weekly - 1 sets - 10 reps  Seated Toe Raise - 1 x daily - 7 x weekly - 1 sets - 10 reps  Seated Ankle Inversion AROM - 1 x daily - 7 x weekly - 1 sets - 10 reps  Seated Ankle Inversion Eversion AROM - 1 x daily - 7 x weekly - 1 sets - 10 reps  Seated Heel Slide - 1 x daily - 7 x weekly - 1 sets - 10 reps    Access Code: [email or web address]  URL: [email or web address]  Date: 02/07/2023  Prepared by: Cathern Boeck    Exercises  Ankle Dorsiflexion with Resistance - 1 x daily - 7 x weekly - 1 sets - 10 reps  Seated Ankle Dorsiflexion with Resistance - 1 x daily - 7 x weekly - 1 sets - 10 reps  Ankle and Toe Plantarflexion with Resistance - 1 x daily - 7 x weekly - 1 sets - 10 reps  Seated Ankle Inversion with Resistance and Legs Crossed - 1 x daily - 7 x weekly - 1 sets - 10 reps  Long Sitting Ankle Eversion with Resistance - 1 x daily - 7 x weekly - 1 sets - 10 reps        Therapeutic Exercise and NMR EXR  [x] (53982) Provided verbal/tactile cueing for activities related to strengthening, flexibility, endurance, ROM for improvements in LE, proximal hip, and core control with self care, mobility, lifting, ambulation.  [] (56748) Provided verbal/tactile cueing for activities related to improving balance, coordination, kinesthetic sense, posture, motor skill, proprioception  to assist with LE, proximal hip, and core control in self care, mobility, lifting, ambulation and eccentric single leg control.  [facility] and Therapeutic Activities:    [x] (46480 or 34140) Provided verbal/tactile cueing for activities related to improving balance, coordination, kinesthetic sense, posture, motor skill, proprioception and motor activation to allow for proper function of core, proximal hip and LE with self care and ADLs and functional mobility.   [] (54287) Gait Re-education- Provided training and instruction to the patient for proper LE, core and proximal hip recruitment and positioning and eccentric body weight control with ambulation re-education including up and down stairs Home Exercise Program:    [x] (88125) Reviewed/Progressed HEP activities related to strengthening, flexibility, endurance, ROM of core, proximal hip and LE for functional self-care, mobility, lifting and ambulation/stair navigation   [] (37697)Reviewed/Progressed HEP activities related to improving balance, coordination, kinesthetic sense, posture, motor skill, proprioception of core, proximal hip and LE for self care, mobility, lifting, and ambulation/stair navigation      Manual Treatments:  PROM / STM / Oscillations-Mobs:  G-I, II, III, IV (PA's, Inf., Post.)  [x] (59966) Provided manual therapy to mobilize LE, proximal hip and/or LS spine soft tissue/joints for the purpose of modulating pain, promoting relaxation,  increasing ROM, reducing/eliminating soft tissue swelling/inflammation/restriction, improving soft tissue extensibility and allowing for proper ROM for normal function with self care, mobility, lifting and ambulation. If [facility] Please Indicate Time In/Out  CPT Code Time in Time out   Eval     Ex 10:30 10:50   NMR 10:50 11:00   TA 10:00 11:10   Man 11:10 11:15   CP 11:15 11:30       Charges:  Timed Code Treatment Minutes: 45   Total Treatment Minutes: 60      [] EVAL (LOW) 22541 (typically 20 minutes face-to-face)  [] EVAL (MOD) 33784 (typically 30 minutes face-to-face)  [] EVAL (HIGH) 11871 (typically 45 minutes face-to-face)  [] RE-EVAL     [x] YP(98735) x     [] Dry needle 1 or 2 Muscles (64686)  [x] NMR (60424) x     [] Dry needle 3+ Muscles (45321)  [] Manual (67757) x     [] Ultrasound (14205) x  [x] TA (16415) x     [] Mech Traction (91226)  [] ES(attended) (87903)     [] ES (un) (22 360331):   [] Vasopump (29527) [] Ionto (54181)   [] Other:    GOALS:  Patient stated goal: \" walk without brace\"  [] Progressing: [] Met: [] Not Met: [] Adjusted     Therapist goals for Patient:   Short Term Goals: To be achieved in: 2 weeks  1.  Independent in HEP and progression per patient tolerance, in order to prevent re-injury. [] Progressing: [] Met: [] Not Met: [] Adjusted  2. Patient will have a decrease in pain by 20-30% to facilitate improvement in movement, function, and ADLs as indicated by Functional Deficits. [] Progressing: [] Met: [] Not Met: [] Adjusted     Long Term Goals: To be achieved in: at d/c   1. Improve WOMAC functional outcome score fby 10 points or more to assist with reaching prior level of function. [] Progressing: [] Met: [] Not Met: [] Adjusted  2. Patient will have a decrease in pain by 40-50% to facilitate improvement in movement, function, and ADLs as indicated by Functional Deficits. [] Progressing: [] Met: [] Not Met: [] Adjusted  3. Patient will demonstrate increased AROM to +5 DF, 35 inv, 25 evr to allow for proper joint functioning as indicated by patients Functional Deficits. [] Progressing: [] Met: [] Not Met: [] Adjusted  4. Patient will demonstrate an increase in Strength to 5/5 in R ankle/LE to allow for proper functional mobility as indicated by patients Functional Deficits. [] Progressing: [] Met: [] Not Met: [] Adjusted  5. Patient will return to amb without brace/AD without increased symptoms or restriction. [] Progressing: [] Met: [] Not Met: [] Adjusted  6.  \"Work and sports\"(patient specific functional goal)    [] Progressing: [] Met: [] Not Met: [] Adjusted         ASSESSMENT:  1/26: pt's s/s consistent with post surgical status R foot/ankle; skilled PT necessary for ROM/strength/gait  1/31: skilled PT to gradually progress WB, ROM, balance and strength; [place] new ex well   2/7: tolerated WB progression well today with skilled PT/supervision    2/9: pt belongs to [facility] and was able to go and swim a few laps of breaststroke and freestyle without much c/o; pt remains tight haile with DF ROM in ankle; cont skilled therapy for ROM and functional strength; first steps out of bed are getting a little better     2/14: gradually returning to normal ADLs and inc activity levels well; cont skilled therapy to progress functional strength also  2/21: tolerating progression of PT ex and gradual return to activity very well     Treatment/Activity Tolerance:  [x] Patient tolerated treatment well [] Patient limited by fatique  [] Patient limited by pain  [] Patient limited by other medical complications  [] Other:     Overall Progression Towards Functional goals/ Treatment Progress Update:  [x] Patient is progressing as expected towards functional goals listed. [] Progression is slowed due to complexities/Impairments listed. [] Progression has been slowed due to co-morbidities. [] Plan just implemented, too soon to assess goals progression <30days   [] Goals require adjustment due to lack of progress  [] Patient is not progressing as expected and requires additional follow up with physician  [] Other    Prognosis for POC: [x] Good [] Fair  [] Poor    Patient requires continued skilled intervention: [x] Yes  [] No        PLAN: Add as indicated above  [x] Continue per plan of care [] Alter current plan (see comments)  [] Plan of care initiated [] Hold pending MD visit [] Discharge    Electronically signed by: Mitra Martinez PTMPT [id number]    Note: If patient does not return for scheduled/recommended follow up visits, this note will serve as a discharge from care along with the most recent update on progress.

## 2023-02-28 ENCOUNTER — HOSPITAL ENCOUNTER (OUTPATIENT)
Dept: PHYSICAL THERAPY | Age: 58
Setting detail: THERAPIES SERIES
Discharge: HOME | End: 2023-02-28
Payer: COMMERCIAL

## 2023-02-28 PROCEDURE — 97530 THERAPEUTIC ACTIVITIES: CPT

## 2023-02-28 PROCEDURE — 97112 NEUROMUSCULAR REEDUCATION: CPT

## 2023-02-28 PROCEDURE — 97110 THERAPEUTIC EXERCISES: CPT

## 2023-02-28 NOTE — FLOWSHEET NOTE
Joint venture between [facility] - Outpatient Rehabilitation and Therapy, [facility]  [address] [facility], [facility]  Phone: [phone]   Fax:     [phone]        Physical Therapy Re-Certification Plan of Care/MD UPDATE      Dear Sherrell Merritt MD,    We had the pleasure of treating the following patient for physical therapy services at [address]. A summary of our findings can be found in the updated assessment below. This includes our plan of care. If you have any questions or concerns regarding these findings, please do not hesitate to contact me at the office phone number checked above.   Thank you for the referral.     Physician Signature:________________________________Date:__________________  By signing above (or electronic signature), therapists plan is approved by physician    Date Range Of Visits: 1/26/23-2/28/23  Total Visits to Date: 9  Overall Response to Treatment:   [x]Patient is responding well to treatment and improvement is noted with regards  to goals   []Patient should continue to improve in reasonable time if they continue HEP   []Patient has plateaued and is no longer responding to skilled PT intervention    []Patient is getting worse and would benefit from return to referring MD   []Patient unable to adhere to initial POC   [x]Other:   *updated on 2/28  *sees MD today and anticipating potential RTW  *amb without brace, but tends to put ACE wrap on first thing in the AM until things loosen up and then takes it off later in the day (discussed option of compression sleeve/support for ankle)   *taking OTC ibuprofen as needed   *[place] activities (swimming and biking) without problem, but WB activities like walking x 30 min will inc pain  *hep daily   *pain avg 2-3/10 with typical day; overall feels 60% better  *feels strength is improving and ROM is 80% there but functionally still having trouble with prolonged amb, steps, household tasks like carrying laundry up/dwn steps    *ROM DF 0, PF 66, inv 28 active/32 passive, evr 22 active   *MMT: DF 5/5, inv 5/5, evr 5/5     Recommendation:    [x]Continue PT 2 x / wk for 4 weeks as prescribed/authorized    []Hold PT, pending MD visit    Physical Therapy Treatment Note/ Progress Report:     Date:  2023    Patient Name:  Renny Reardon \"Ewa\"   :  1965  MRN: [id number]    Pertinent Medical History:     Medical/Treatment Diagnosis Information:  Medical Diagnosis: Closed displaced fracture of body of talus [S92.123A]  Treatment Diagnosis: pain, dec gait/ROM/strength limiting ADLs    Insurance/Certification information:  PT Insurance Information: [facility] 18 visit; end date extension 23-3/31/23  Physician Information:  Boy Moreira MD  Plan of care signed (Y/N): yes    Date of Patient follow up with Physician: 23     Progress Report: [x]  Yes 23  []  No     Date Range for reporting period:  Beginnin2023  Ending:      Progress report due (10 Rx/or 30 days whichever is less):      Recertification due (POC duration/ or 90 days whichever is less):      Visit # POC/Insurance Allowable Auth Needed     18 visits   23-3/31/23 [x]Yes   []No     Latex Allergy:  [x]NO      []YES  Preferred Language for Healthcare:   [x]English       []Other:    Functional Scale:       Date assessed: at [facility]  Test:WOMAC  Score:Raw score 58    Pain level:  4/10     History of Injury: R ankle/talus fx on 22 while stepping off pallet at work. She had ORIF on . Most pain is across top of foot ranges from 3-6/10 with c/o pain waking her at night. She is not icing, edu on benefits of ice daily; taking OTC ibuprofen; has started some light yoga/pilates and TB PF ex; denies N/T. Pt is off work right now.      SUBJECTIVE:  See eval  : just stiffness now with ibuprofen in her system; ankle circles hep is challenging, but overall feels pain intensity is getting better; to PT with 1 crutch and ace wrap on ankle   2/7: working through some AM stiffness  2/9: not much pain, more stiffness posterior ankle; pt belongs to [facility] and was able to go and swim a few laps of breaststroke and freestyle without much c/o   2/14: to PT without crutch and was able to take the steps up to PT (reciprocally); sore last night as she thinks she overdid it with swimming/yard work and trying to get back into normal ADLs, but ice and ibuprofen helped and she is only a little tender today   2/16: did a lot of yard work the past 2 days, pain is low but she can tell her ankle is more swollen  2/21: just annoying stiffness; tolerated a 1 hour outdoor bike ride on Sunday without c/o; gradually resuming gym ex and was able to do lunges without c/o; to PT with ankle ACE wrapped and states only wraps it in the AM  (some ex modified/held due to pt arrived 7 min late to session)   2/23: able to get up early and do a little ex; to PT without ACE wrap; night pain is less but still needs OTC ibuprofen first thing in AM but doesn't take it consistently throughout the day; plans to do another bike ride today    2/28: sees MD today and anticipating RTW she tried to walk today and a 30 min walk outside inc pain to 4/10; see PN - WOMAC next time at visit 10      OBJECTIVE:  Observation:   Test measurements:      RESTRICTIONS/PRECAUTIONS:     Exercises/Interventions:     Therapeutic Ex (38671)   Min:20 Reps/Resistance Notes/CUES  R ankle talus fx/ORIF   Airdyne Seat 4 x 5 min  Edu for hep including: AROM, calf and inv/evr str - see below   GSS incline  Sol stretch incline  3 x 20 sec  3 x 20 sec      MWM DF stretch on step 5 x 10 sec  Improved ROM   Standing HR hep   Fitter  1 thin f/b s/s x 10 each     R SL squat w/ star/tap L  F/b x 2 reps          Leg press    R SL press   R SL HR   30# 2 x 10       Held due to inc pain - resume        TB 4 way    Hep - Pt has several bands at home  2/14: able to progress to next levels of bands at home         Therapeutic Activity (23243) Min:10    Step up fwd  Step down 8\" x 10  8\" x 10    Jog on mini trampoline Bouncy march X 30 sec     Amb in ll bars:   Re [place] walk    X 4 lengths                NMR re-education (38567)  Min:10  Cues for tech as needed    Gumdrop    roll cw/ccw   balance      upside up x 30 sec     Baps 4 way  L2 FWB x 10 cw/ccw      Bosu   Balance   Mini squat     Held due to pain - resume   Rocker board f/b s/s    Airex   Step up R   SLS   Step up R x 5  X 30 sec         Manual Intervention (31274) Min: 5     Joint mobs   TC, subtalar, MT   Gentle grd 1-2     PROM 4 way X 10 each                    Modalities  Min:x 15 min      CP  Supine w/ wedge x 15 min                 Other Therapeutic Activities: Pt was educated on PT POC, Diagnosis, Prognosis, pathomechanics as well as frequency and duration of scheduling future physical therapy appointments. Time was also taken on this day to answer all patient questions and participation in PT. Reviewed appointment policy in detail with patient and patient verbalized understanding. Home Exercise Program: Patient was instructed in the following for HEP:     . Patient verbalized/demonstrated understanding and was issued written handout. Access Code: [email or web address]  URL: [email or web address]/  Date: 01/26/2023  Prepared by: Elisabeth Guerrier     Exercises  Supine Single Leg Ankle Pumps - 1-2 x daily - 7 x weekly - 1 sets - 10 reps  Supine Ankle Circles - 1-2 x daily - 7 x weekly - 1 sets - 10 reps  Supine Ankle Inversion Eversion AROM - 1-2 x daily - 7 x weekly - 1 sets - 10 reps  Seated Toe Curl - 1-2 x daily - 7 x weekly - 1 sets - 10 reps  Towel Scrunches - 1-2 x daily - 7 x weekly - 1 sets - 10 reps  Ankle Alphabet in Elevation - 1-2 x daily - 7 x weekly - 1 sets - 10 reps  Long Sitting Calf Stretch with Strap - 1 x daily - 7 x weekly - 1 sets - 3-5 reps - 10 hold  Seated Ankle Inversion Eversion PROM - 1 x daily - 7 x weekly - 3 sets - 3-5 reps - 10 hold    Access Code: [email or web address]  URL: [email or web address]  Date: 01/31/2023  Prepared by: Clement Royal    Exercises  Seated Heel Raise - 1 x daily - 7 x weekly - 1 sets - 10 reps  Seated Toe Raise - 1 x daily - 7 x weekly - 1 sets - 10 reps  Seated Ankle Inversion AROM - 1 x daily - 7 x weekly - 1 sets - 10 reps  Seated Ankle Inversion Eversion AROM - 1 x daily - 7 x weekly - 1 sets - 10 reps  Seated Heel Slide - 1 x daily - 7 x weekly - 1 sets - 10 reps    Access Code: [email or web address]  URL: [email or web address]/  Date: 02/07/2023  Prepared by: Clement Royal    Exercises  Ankle Dorsiflexion with Resistance - 1 x daily - 7 x weekly - 1 sets - 10 reps  Seated Ankle Dorsiflexion with Resistance - 1 x daily - 7 x weekly - 1 sets - 10 reps  Ankle and Toe Plantarflexion with Resistance - 1 x daily - 7 x weekly - 1 sets - 10 reps  Seated Ankle Inversion with Resistance and Legs Crossed - 1 x daily - 7 x weekly - 1 sets - 10 reps  Long Sitting Ankle Eversion with Resistance - 1 x daily - 7 x weekly - 1 sets - 10 reps        Therapeutic Exercise and NMR EXR  [x] (47843) Provided verbal/tactile cueing for activities related to strengthening, flexibility, endurance, ROM for improvements in LE, proximal hip, and core control with self care, mobility, lifting, ambulation.  [] (31156) Provided verbal/tactile cueing for activities related to improving balance, coordination, kinesthetic sense, posture, motor skill, proprioception  to assist with LE, proximal hip, and core control in self care, mobility, lifting, ambulation and eccentric single leg control.  [facility] Activities:    [x] (10359 or 18295) Provided verbal/tactile cueing for activities related to improving balance, coordination, kinesthetic sense, posture, motor skill, proprioception and motor activation to allow for proper function of core, proximal hip and LE with self care and ADLs and functional mobility.   [] (84355) Gait Re-education- Provided training and instruction to the patient for proper LE, core and proximal hip recruitment and positioning and eccentric body weight control with ambulation re-education including up and down stairs     Home Exercise Program:    [x] (03521) Reviewed/Progressed HEP activities related to strengthening, flexibility, endurance, ROM of core, proximal hip and LE for functional self-care, mobility, lifting and ambulation/stair navigation   [] (80910)Reviewed/Progressed HEP activities related to improving balance, coordination, kinesthetic sense, posture, motor skill, proprioception of core, proximal hip and LE for self care, mobility, lifting, and ambulation/stair navigation      Manual Treatments:  PROM / STM / Oscillations-Mobs:  G-I, II, III, IV (PA's, Inf., Post.)  [x] (89265) Provided manual therapy to mobilize LE, proximal hip and/or LS spine soft tissue/joints for the purpose of modulating pain, promoting relaxation,  increasing ROM, reducing/eliminating soft tissue swelling/inflammation/restriction, improving soft tissue extensibility and allowing for proper ROM for normal function with self care, mobility, lifting and ambulation.      If BW Please Indicate Time In/Out  CPT Code Time in Time out   Eval     Ex 10:30 10:50   NMR 10:50 11:00   TA 10:00 11:10   Man 11:10 11:15   CP 11:15 11:30       Charges:  Timed Code Treatment Minutes: 45   Total Treatment Minutes: 60      [] EVAL (LOW) 58337 (typically 20 minutes face-to-face)  [] EVAL (MOD) 67495 (typically 30 minutes face-to-face)  [] EVAL (HIGH) 17412 (typically 45 minutes face-to-face)  [] RE-EVAL     [x] RZ(18653) x     [] Dry needle 1 or 2 Muscles (52398)  [x] NMR (85353) x     [] Dry needle 3+ Muscles (92615)  [] Manual (16986) x     [] Ultrasound (78683) x  [x] TA (98443) x     [] Mech Traction (12628)  [] ES(attended) (13157)     [] ES (un) (49217):   [] Vasopump (24754) [] Ionto (13141)   [] Other:    GOALS:  *updated on 2/28  *sees MD today and anticipating potential RTW  *amb without brace, but tends to put ACE wrap on first thing in the AM until things loosen up and then takes it off later in the day (discussed option of compression sleeve/support for ankle)   *taking OTC ibuprofen as needed   *[place] [employer] activities (swimming and biking) without problem, but WB activities like walking x 30 min will inc pain  *hep daily   *pain avg 2-3/10 with typical day; overall feels 60% better  *feels strength is improving and ROM is 80% there but functionally still having trouble with prolonged amb, steps, household tasks like carrying laundry up/dwn steps    *ROM DF 0, PF 66, inv 28 active/32 passive, evr 22 active   *MMT: DF 5/5, inv 5/5, evr 5/5       Patient stated goal: \" walk without brace\"  [] Progressing: [x] Met: [] Not Met: [] Adjusted     Therapist goals for Patient:   Short Term Goals: To be achieved in: 2 weeks  1. Independent in HEP and progression per patient tolerance, in order to prevent re-injury. [] Progressing: [x] Met: [] Not Met: [] Adjusted  2. Patient will have a decrease in pain by 20-30% to facilitate improvement in movement, function, and ADLs as indicated by Functional Deficits. [] Progressing: [x] Met: [] Not Met: [] Adjusted     Long Term Goals: To be achieved in: at d/c   1. Improve WOMAC functional outcome score fby 10 points or more to assist with reaching prior level of function. [x] Progressing: [] Met: [] Not Met: [] Adjusted  2. Patient will have a decrease in pain by 40-50% to facilitate improvement in movement, function, and ADLs as indicated by Functional Deficits. [x] Progressing: [] Met: [] Not Met: [] Adjusted  3. Patient will demonstrate increased AROM to +5 DF, 35 inv, 25 evr to allow for proper joint functioning as indicated by patients Functional Deficits. [x] Progressing: [] Met: [] Not Met: [] Adjusted  4.  Patient will demonstrate an increase in Strength to 5/5 in R ankle/LE to allow for proper functional mobility as indicated by patients Functional Deficits. [x] Progressing: [] Met: [] Not Met: [] Adjusted  5. Patient will return to amb without brace/AD without increased symptoms or restriction. [] Progressing: [x] Met: [] Not Met: [] Adjusted  6. \"Work and sports\"(patient specific functional goal)    [x] Progressing: [] Met: [] Not Met: [] Adjusted         ASSESSMENT:  1/26: pt's s/s consistent with post surgical status R foot/ankle; skilled PT necessary for ROM/strength/gait  1/31: skilled PT to gradually progress WB, ROM, balance and strength; [place] new ex well   2/7: tolerated WB progression well today with skilled PT/supervision    2/9: pt belongs to [facility] and was able to go and swim a few laps of breaststroke and freestyle without much c/o; pt remains tight haile with DF ROM in ankle; cont skilled therapy for ROM and functional strength; first steps out of bed are getting a little better     2/14: gradually returning to normal ADLs and inc activity levels well; cont skilled therapy to progress functional strength also  2/21: tolerating progression of PT ex and gradual return to activity very well   2/28: inc pain with trial of prolonged walking today; will see MD for assessment today and pending RTW    Treatment/Activity Tolerance:  [x] Patient tolerated treatment well [] Patient limited by fatique  [] Patient limited by pain  [] Patient limited by other medical complications  [] Other:     Overall Progression Towards Functional goals/ Treatment Progress Update:  [x] Patient is progressing as expected towards functional goals listed. [] Progression is slowed due to complexities/Impairments listed. [] Progression has been slowed due to co-morbidities.   [] Plan just implemented, too soon to assess goals progression <30days   [] Goals require adjustment due to lack of progress  [] Patient is not progressing as expected and requires additional follow up with physician  [] Other    Prognosis for POC: [x] Good [] Fair  [] Poor    Patient requires continued skilled intervention: [x] Yes  [] No        PLAN: Add as indicated above  [x] Continue per plan of care [] Alter current plan (see comments)  [] Plan of care initiated [] Hold pending MD visit [] Discharge    Electronically signed by: Deborah Rowley, PTMPT [id number]    Note: If patient does not return for scheduled/recommended follow up visits, this note will serve as a discharge from care along with the most recent update on progress.

## 2023-03-02 ENCOUNTER — HOSPITAL ENCOUNTER (OUTPATIENT)
Dept: PHYSICAL THERAPY | Age: 58
Setting detail: THERAPIES SERIES
Discharge: HOME | End: 2023-03-02
Payer: COMMERCIAL

## 2023-03-02 PROCEDURE — 97112 NEUROMUSCULAR REEDUCATION: CPT

## 2023-03-02 PROCEDURE — 97110 THERAPEUTIC EXERCISES: CPT

## 2023-03-02 PROCEDURE — 97530 THERAPEUTIC ACTIVITIES: CPT

## 2023-03-02 NOTE — FLOWSHEET NOTE
[facility] - Outpatient Rehabilitation and Therapy, [facility]  [address] [facility], [facility]  Phone: [phone]   Fax:     [phone]          Physical Therapy Treatment Note/ Progress Report:     Date:  2023    Patient Name:  Frank Carrillo \"Ewa\"   :  1965  MRN: [id number]    Pertinent Medical History:     Medical/Treatment Diagnosis Information:  Medical Diagnosis: Closed displaced fracture of body of talus [S92.123A]  Treatment Diagnosis: pain, dec gait/ROM/strength limiting ADLs    Insurance/Certification information:  PT Insurance Information: [facility] - 18 visit; end date extension 23-3/31/23  Physician Information:  Jose Lam MD  Plan of care signed (Y/N): yes    Date of Patient follow up with Physician: 23     Progress Report: []  Yes 23  [x]  No     Date Range for reporting period:  Beginnin2023  Ending:      Progress report due (10 Rx/or 30 days whichever is less):      Recertification due (POC duration/ or 90 days whichever is less):      Visit # POC/Insurance Allowable Auth Needed   10 / 18  18 visits   23-3/31/23 [x]Yes   []No     Latex Allergy:  [x]NO      []YES  Preferred Language for Healthcare:   [x]English       []Other:    Functional Scale:       Date assessed: at eval  Test:WOMAC  Score:Raw score 58  Update on visit 10 3/2/23: 24    Pain level:  5/10     History of Injury: R ankle/talus fx on 22 while stepping off pallet at work. She had ORIF on . Most pain is across top of foot ranges from 3-6/10 with c/o pain waking her at night. She is not icing, edu on benefits of ice daily; taking OTC ibuprofen; has started some light yoga/pilates and TB PF ex; denies N/T. Pt is off work right now.      SUBJECTIVE:  See eval  : just stiffness now with ibuprofen in her system; ankle circles hep is challenging, but overall feels pain intensity is getting better; to PT with 1 crutch and ace wrap on ankle   2/7: working through some AM stiffness  2/9: not much pain, more stiffness posterior ankle; pt belongs to [facility] and was able to go and swim a few laps of breaststroke and freestyle without much c/o   2/14: to PT without crutch and was able to take the steps up to PT (reciprocally); sore last night as she thinks she overdid it with swimming/yard work and trying to get back into normal ADLs, but ice and ibuprofen helped and she is only a little tender today   2/16: did a lot of yard work the past 2 days, pain is low but she can tell her ankle is more swollen  2/21: just annoying stiffness; tolerated a 1 hour outdoor bike ride on Sunday without c/o; gradually resuming gym ex and was able to do lunges without c/o; to PT with ankle ACE wrapped and states only wraps it in the AM  (some ex modified/held due to pt arrived 7 min late to session)   2/23: able to get up early and do a little ex; to PT without ACE wrap; night pain is less but still needs OTC ibuprofen first thing in AM but doesn't take it consistently throughout the day; plans to do another bike ride today    2/28: sees MD today and anticipating RTW she tried to walk today and a 30 min walk outside inc pain to 4/10; see PN - WOMAC next time at visit 10  3/2: cont soreness from her walk on Monday; saw MD Tues: to return to work 3/6 light duty with seated break every 30 min and will f/u with MD in 2 more months to discuss removal of hardware       OBJECTIVE:  Observation:   Test measurements:      RESTRICTIONS/PRECAUTIONS:     Exercises/Interventions:     Therapeutic Ex (70521)   Min:15 Reps/Resistance Notes/CUES  R ankle talus fx/ORIF   Airdyne Seat 4 x 5 min  Edu for hep including: AROM, calf and inv/evr str - see below   GSS incline  Sol stretch incline  3 x 20 sec  3 x 20 sec      MWM DF stretch on step 5 x 10 sec  Improved ROM   Standing HR hep   Fitter  1 thin f/b s/s x 10 each     R SL squat w/ star/tap L F/b x 2 reps          Leg press    R SL press   R SL HR   30# 2 x 10       Held due to inc pain - resume        TB 4 way    Hep - Pt has several bands at home  2/14: able to progress to next levels of bands at home         Therapeutic Activity (01641) Min:10    Step up fwd  Step down 8\" x 10  8\" x 10    Jog on mini trampoline Bouncy march X 30 sec     Amb in ll bars:   Mar Rife walk    X 4 lengths                NMR re-education (59724)  Min:10  Cues for tech as needed    Gumdrop    roll cw/ccw   balance      upside up x 30 sec     Baps 4 way  L2 FWB x 10 cw/ccw      Bosu   Balance   Mini squat     Held due to pain - resume   Rocker board f/b s/s    Airex   Step up R   SLS   Step up R x 5  X 30 sec         Manual Intervention ([id number]) Min: 5     Joint mobs   TC, subtalar, MT   Gentle grd 1-2     PROM 4 way X 10 each                    Modalities  Min:x 15 min      CP  Supine w/ wedge x 15 min                 Other Therapeutic Activities: Pt was educated on PT POC, Diagnosis, Prognosis, pathomechanics as well as frequency and duration of scheduling future physical therapy appointments. Time was also taken on this day to answer all patient questions and participation in PT. Reviewed appointment policy in detail with patient and patient verbalized understanding. Home Exercise Program: Patient was instructed in the following for HEP:     . Patient verbalized/demonstrated understanding and was issued written handout. Access Code: [email or web address]  URL: [email or web address]  Date: 01/26/2023  Prepared by: Lashawn Barrientosric     Exercises  Supine Single Leg Ankle Pumps - 1-2 x daily - 7 x weekly - 1 sets - 10 reps  Supine Ankle Circles - 1-2 x daily - 7 x weekly - 1 sets - 10 reps  Supine Ankle Inversion Eversion AROM - 1-2 x daily - 7 x weekly - 1 sets - 10 reps  Seated Toe Curl - 1-2 x daily - 7 x weekly - 1 sets - 10 reps  Towel Scrunches - 1-2 x daily - 7 x weekly - 1 sets - 10 reps  Ankle Alphabet in Elevation - 1-2 x daily - 7 x weekly - 1 sets - 10 reps  Long Sitting Calf Stretch with Strap - 1 x daily - 7 x weekly - 1 sets - 3-5 reps - 10 hold  Seated Ankle Inversion Eversion PROM - 1 x daily - 7 x weekly - 3 sets - 3-5 reps - 10 hold    Access Code: [email or web address]  URL: [employer]/  Date: 01/31/2023  Prepared by: Adan Juan M    Exercises  Seated Heel Raise - 1 x daily - 7 x weekly - 1 sets - 10 reps  Seated Toe Raise - 1 x daily - 7 x weekly - 1 sets - 10 reps  Seated Ankle Inversion AROM - 1 x daily - 7 x weekly - 1 sets - 10 reps  Seated Ankle Inversion Eversion AROM - 1 x daily - 7 x weekly - 1 sets - 10 reps  Seated Heel Slide - 1 x daily - 7 x weekly - 1 sets - 10 reps    Access Code: [email or web address]  URL: [employer]/  Date: 02/07/2023  Prepared by: Adan Juan M    Exercises  Ankle Dorsiflexion with Resistance - 1 x daily - 7 x weekly - 1 sets - 10 reps  Seated Ankle Dorsiflexion with Resistance - 1 x daily - 7 x weekly - 1 sets - 10 reps  Ankle and Toe Plantarflexion with Resistance - 1 x daily - 7 x weekly - 1 sets - 10 reps  Seated Ankle Inversion with Resistance and Legs Crossed - 1 x daily - 7 x weekly - 1 sets - 10 reps  Long Sitting Ankle Eversion with Resistance - 1 x daily - 7 x weekly - 1 sets - 10 reps        Therapeutic Exercise and NMR EXR  [x] (63008) Provided verbal/tactile cueing for activities related to strengthening, flexibility, endurance, ROM for improvements in LE, proximal hip, and core control with self care, mobility, lifting, ambulation.  [] (49073) Provided verbal/tactile cueing for activities related to improving balance, coordination, kinesthetic sense, posture, motor skill, proprioception  to assist with LE, proximal hip, and core control in self care, mobility, lifting, ambulation and eccentric single leg control.  [address] [facility] and Therapeutic Activities:    [x] (70264 or 61309) Provided verbal/tactile cueing for activities related to improving balance, coordination, kinesthetic sense, posture, motor skill, proprioception and motor activation to allow for proper function of core, proximal hip and LE with self care and ADLs and functional mobility.   [] (95213) Gait Re-education- Provided training and instruction to the patient for proper LE, core and proximal hip recruitment and positioning and eccentric body weight control with ambulation re-education including up and down stairs     Home Exercise Program:    [x] (73945) Reviewed/Progressed HEP activities related to strengthening, flexibility, endurance, ROM of core, proximal hip and LE for functional self-care, mobility, lifting and ambulation/stair navigation   [] (15925)Reviewed/Progressed HEP activities related to improving balance, coordination, kinesthetic sense, posture, motor skill, proprioception of core, proximal hip and LE for self care, mobility, lifting, and ambulation/stair navigation      Manual Treatments:  PROM / STM / Oscillations-Mobs:  G-I, II, III, IV (PA's, Inf., Post.)  [x] (55735) Provided manual therapy to mobilize LE, proximal hip and/or LS spine soft tissue/joints for the purpose of modulating pain, promoting relaxation,  increasing ROM, reducing/eliminating soft tissue swelling/inflammation/restriction, improving soft tissue extensibility and allowing for proper ROM for normal function with self care, mobility, lifting and ambulation.      If [facility] Please Indicate Time In/Out  CPT Code Time in Time out   Eval     Ex 10:30 10:45   NMR 10:45 10:55   TA 10:55 11:05   Man 11:05 11:10   CP 11:10 11:25       Charges:  Timed Code Treatment Minutes: 40   Total Treatment Minutes: 55      [] EVAL (LOW) 22724 (typically 20 minutes face-to-face)  [] EVAL (MOD) 46322 (typically 30 minutes face-to-face)  [] EVAL (HIGH) 20606 (typically 45 minutes face-to-face)  [] RE-EVAL     [x] GJ(39743) x     [] Dry needle 1 or 2 Muscles (90356)  [x] NMR (60248) x     [] Dry needle 3+ Muscles (45179)  [] Manual (53080) x     [] Ultrasound (82708) x  [x] TA (46188) x     [] Mech Traction (39666)  [] ES(attended) (04476)     [] ES (un) (53854):   [] Vasopump (54476) [] Ionto (81980)   [] Other:    GOALS:  *updated on 2/28  *sees MD today and anticipating potential RTW  *amb without brace, but tends to put ACE wrap on first thing in the AM until things loosen up and then takes it off later in the day (discussed option of compression sleeve/support for ankle)   *taking OTC ibuprofen as needed   *[place] [employer] activities (swimming and biking) without problem, but WB activities like walking x 30 min will inc pain  *hep daily   *pain avg 2-3/10 with typical day; overall feels 60% better  *feels strength is improving and ROM is 80% there but functionally still having trouble with prolonged amb, steps, household tasks like carrying laundry up/dwn steps    *ROM DF 0, PF 66, inv 28 active/32 passive, evr 22 active   *MMT: DF 5/5, inv 5/5, evr 5/5       Patient stated goal: \" walk without brace\"  [] Progressing: [x] Met: [] Not Met: [] Adjusted     Therapist goals for Patient:   Short Term Goals: To be achieved in: 2 weeks  1. Independent in HEP and progression per patient tolerance, in order to prevent re-injury. [] Progressing: [x] Met: [] Not Met: [] Adjusted  2. Patient will have a decrease in pain by 20-30% to facilitate improvement in movement, function, and ADLs as indicated by Functional Deficits. [] Progressing: [x] Met: [] Not Met: [] Adjusted     Long Term Goals: To be achieved in: at d/c   1. Improve WOMAC functional outcome score fby 10 points or more to assist with reaching prior level of function. [x] Progressing: [] Met: [] Not Met: [] Adjusted  2. Patient will have a decrease in pain by 40-50% to facilitate improvement in movement, function, and ADLs as indicated by Functional Deficits. [x] Progressing: [] Met: [] Not Met: [] Adjusted  3.  Patient will demonstrate increased AROM to +5 DF, 35 inv, 25 evr to allow for proper joint functioning as indicated by patients Functional Deficits. [x] Progressing: [] Met: [] Not Met: [] Adjusted  4. Patient will demonstrate an increase in Strength to 5/5 in R ankle/LE to allow for proper functional mobility as indicated by patients Functional Deficits. [x] Progressing: [] Met: [] Not Met: [] Adjusted  5. Patient will return to amb without brace/AD without increased symptoms or restriction. [] Progressing: [x] Met: [] Not Met: [] Adjusted  6. \"Work and sports\"(patient specific functional goal)    [x] Progressing: [] Met: [] Not Met: [] Adjusted         ASSESSMENT:  1/26: pt's s/s consistent with post surgical status R foot/ankle; skilled PT necessary for ROM/strength/gait  1/31: skilled PT to gradually progress WB, ROM, balance and strength; [place] new ex well   2/7: tolerated WB progression well today with skilled PT/supervision    2/9: pt belongs to [employer] and was able to go and swim a few laps of breaststroke and freestyle without much c/o; pt remains tight haile with DF ROM in ankle; cont skilled therapy for ROM and functional strength; first steps out of bed are getting a little better     2/14: gradually returning to normal ADLs and inc activity levels well; cont skilled therapy to progress functional strength also  2/21: tolerating progression of PT ex and gradual return to activity very well   2/28: inc pain with trial of prolonged walking today; will see MD for assessment today and pending RTW  3/2: modifications with POC due to cont soreness; will resume when able; pt to RTW light duty next week     Treatment/Activity Tolerance:  [x] Patient tolerated treatment well [] Patient limited by fatique  [] Patient limited by pain  [] Patient limited by other medical complications  [] Other:     Overall Progression Towards Functional goals/ Treatment Progress Update:  [x] Patient is progressing as expected towards functional goals listed.     [] Progression is slowed due to complexities/Impairments listed. [] Progression has been slowed due to co-morbidities. [] Plan just implemented, too soon to assess goals progression <30days   [] Goals require adjustment due to lack of progress  [] Patient is not progressing as expected and requires additional follow up with physician  [] Other    Prognosis for POC: [x] Good [] Fair  [] Poor    Patient requires continued skilled intervention: [x] Yes  [] No        PLAN: Add as indicated above; resume when able; assess RTW  [x] Continue per plan of care [] Alter current plan (see comments)  [] Plan of care initiated [] Hold pending MD visit [] Discharge    Electronically signed by: Jaya Fuentes, PTMPT [id number]    Note: If patient does not return for scheduled/recommended follow up visits, this note will serve as a discharge from care along with the most recent update on progress.

## 2023-03-07 ENCOUNTER — HOSPITAL ENCOUNTER (OUTPATIENT)
Dept: PHYSICAL THERAPY | Age: 58
Setting detail: THERAPIES SERIES
Discharge: HOME | End: 2023-03-07
Payer: COMMERCIAL

## 2023-03-07 PROCEDURE — 97530 THERAPEUTIC ACTIVITIES: CPT

## 2023-03-07 PROCEDURE — 97112 NEUROMUSCULAR REEDUCATION: CPT

## 2023-03-07 PROCEDURE — 97110 THERAPEUTIC EXERCISES: CPT

## 2023-03-07 NOTE — FLOWSHEET NOTE
[facility] - Outpatient Rehabilitation and Therapy, [facility]  [address] [facility], [facility]  Phone: [phone]   Fax:     [phone]          Physical Therapy Treatment Note/ Progress Report:     Date:  2023    Patient Name:  Olegario Pimentel \"Ewa\"   :  1965  MRN: [id number]    Pertinent Medical History:     Medical/Treatment Diagnosis Information:  Medical Diagnosis: Closed displaced fracture of body of talus [S92.123A]  Treatment Diagnosis: pain, dec gait/ROM/strength limiting ADLs    Insurance/Certification information:  PT Insurance Information: [facility] - 18 visit; end date extension 23-3/31/23  Physician Information:  Natasha Garner MD  Plan of care signed (Y/N): yes    Date of Patient follow up with Physician: May     Progress Report: []  Yes 23  [x]  No     Date Range for reporting period:  Beginnin2023  Ending:      Progress report due (10 Rx/or 30 days whichever is less): 36     Recertification due (POC duration/ or 90 days whichever is less):      Visit # POC/Insurance Allowable Auth Needed     18 visits   23-3/31/23 [x]Yes   []No     Latex Allergy:  [x]NO      []YES  Preferred Language for Healthcare:   [x]English       []Other:    Functional Scale:       Date assessed: at eval  Test:WOMAC  Score:Raw score 58  Update on visit 10 3/2/23: 24    Pain level:  2/10     History of Injury: R ankle/talus fx on 22 while stepping off pallet at work. She had ORIF on . Most pain is across top of foot ranges from 3-6/10 with c/o pain waking her at night. She is not icing, edu on benefits of ice daily; taking OTC ibuprofen; has started some light yoga/pilates and TB PF ex; denies N/T. Pt is off work right now.      SUBJECTIVE:  See eval  : just stiffness now with ibuprofen in her system; ankle circles hep is challenging, but overall feels pain intensity is getting better; to PT with 1 crutch and ace wrap on ankle   2/7: working through some AM stiffness  2/9: not much pain, more stiffness posterior ankle; pt belongs to [facility] [facility] and was able to go and swim a few laps of breaststroke and freestyle without much c/o   2/14: to PT without crutch and was able to take the steps up to PT (reciprocally); sore last night as she thinks she overdid it with swimming/yard work and trying to get back into normal ADLs, but ice and ibuprofen helped and she is only a little tender today   2/16: did a lot of yard work the past 2 days, pain is low but she can tell her ankle is more swollen  2/21: just annoying stiffness; tolerated a 1 hour outdoor bike ride on Sunday without c/o; gradually resuming gym ex and was able to do lunges without c/o; to PT with ankle ACE wrapped and states only wraps it in the AM  (some ex modified/held due to pt arrived 7 min late to session)   2/23: able to get up early and do a little ex; to PT without ACE wrap; night pain is less but still needs OTC ibuprofen first thing in AM but doesn't take it consistently throughout the day; plans to do another bike ride today    2/28: sees MD today and anticipating RTW she tried to walk today and a 30 min walk outside inc pain to 4/10; see PN - WOMAC next time at visit 10  3/2: cont soreness from her walk on Monday; saw MD [place]: to return to work 3/6 light duty with seated break every 30 min and will f/u with MD in 2 more months to discuss removal of hardware   3/7: back to work light duty and is in a completely different position where she can sit all day; ankle is feeling better and was able to take a walk at lunch yesterday without too much pain       OBJECTIVE:  Observation:   Test measurements:      RESTRICTIONS/PRECAUTIONS:     Exercises/Interventions:     Therapeutic Ex (93342)   Min:13 Reps/Resistance Notes/CUES  R ankle talus fx/ORIF   Airdyne Seat 4 x 5 min  Edu for hep including: AROM, calf and inv/evr str - see below   GSS incline  Sol stretch incline  3 x 20 sec  3 x 20 sec      MWM DF stretch on step 5 x 10 sec  3/7: Improved ROM - less soreness    Standing HR hep   Fitter  1 thin f/b s/s x 10 each     R SL squat w/ star/tap L  F/b x 2 reps          Leg press    R SL press   R SL HR   30# 2 x 10   30# 2 x 10              TB 4 way    Hep - Pt has several bands at home  2/14: able to progress to next levels of bands at home         Therapeutic Activity (92959) Min:10    Step up fwd  Step down 8\" x 10  8\" x 10    Jog on mini trampoline Bouncy march X 30 sec     Amb in ll bars:   Keven Grise walk    X 4 lengths                NMR re-education (03329)  Min:10  Cues for tech as needed    Gumdrop    balance   upside up x 30 sec     Baps 4 way  L2 FWB x 10 cw/ccw      Bosu   Balance   Mini squat   X 45 sec  X 10      Rocker board f/b s/s    Airex   Step up R   SLS   Step up R x 5  X 30 sec         Manual Intervention (77706) Min: 5     Joint mobs   TC, subtalar, MT   Gentle grd 1-2     PROM 4 way X 10 each                    Modalities  Min:x 15 min      CP  Supine w/ wedge x 15 min                 Other Therapeutic Activities: Pt was educated on PT POC, Diagnosis, Prognosis, pathomechanics as well as frequency and duration of scheduling future physical therapy appointments. Time was also taken on this day to answer all patient questions and participation in PT. Reviewed appointment policy in detail with patient and patient verbalized understanding. Home Exercise Program: Patient was instructed in the following for HEP:     . Patient verbalized/demonstrated understanding and was issued written handout. Access Code: [email or web address]  URL: [email or web address]  Date: 01/26/2023  Prepared by: Av Dow     Exercises  Supine Single Leg Ankle Pumps - 1-2 x daily - 7 x weekly - 1 sets - 10 reps  Supine Ankle Circles - 1-2 x daily - 7 x weekly - 1 sets - 10 reps  Supine Ankle Inversion Eversion AROM - 1-2 x daily - 7 x weekly - 1 sets - 10 reps  Seated Toe Curl - 1-2 x daily - 7 x weekly - 1 sets - 10 reps  Towel Scrunches - 1-2 x daily - 7 x weekly - 1 sets - 10 reps  Ankle Alphabet in Elevation - 1-2 x daily - 7 x weekly - 1 sets - 10 reps  Long Sitting Calf Stretch with Strap - 1 x daily - 7 x weekly - 1 sets - 3-5 reps - 10 hold  Seated Ankle Inversion Eversion PROM - 1 x daily - 7 x weekly - 3 sets - 3-5 reps - 10 hold    Access Code: [email or web address]  URL: [email or web address]/  Date: 01/31/2023  Prepared by: Neldon Dom    Exercises  Seated Heel Raise - 1 x daily - 7 x weekly - 1 sets - 10 reps  Seated Toe Raise - 1 x daily - 7 x weekly - 1 sets - 10 reps  Seated Ankle Inversion AROM - 1 x daily - 7 x weekly - 1 sets - 10 reps  Seated Ankle Inversion Eversion AROM - 1 x daily - 7 x weekly - 1 sets - 10 reps  Seated Heel Slide - 1 x daily - 7 x weekly - 1 sets - 10 reps    Access Code: [email or web address]  URL: [email or web address]/  Date: 02/07/2023  Prepared by: Neldon Dom    Exercises  Ankle Dorsiflexion with Resistance - 1 x daily - 7 x weekly - 1 sets - 10 reps  Seated Ankle Dorsiflexion with Resistance - 1 x daily - 7 x weekly - 1 sets - 10 reps  Ankle and Toe Plantarflexion with Resistance - 1 x daily - 7 x weekly - 1 sets - 10 reps  Seated Ankle Inversion with Resistance and Legs Crossed - 1 x daily - 7 x weekly - 1 sets - 10 reps  Long Sitting Ankle Eversion with Resistance - 1 x daily - 7 x weekly - 1 sets - 10 reps        Therapeutic Exercise and NMR EXR  [x] (14152) Provided verbal/tactile cueing for activities related to strengthening, flexibility, endurance, ROM for improvements in LE, proximal hip, and core control with self care, mobility, lifting, ambulation.  [] (78573) Provided verbal/tactile cueing for activities related to improving balance, coordination, kinesthetic sense, posture, motor skill, proprioception  to assist with LE, proximal hip, and core control in self care, mobility, lifting, ambulation and eccentric single leg control. [address] and Therapeutic Activities:    [x] (56425 or 37733) Provided verbal/tactile cueing for activities related to improving balance, coordination, kinesthetic sense, posture, motor skill, proprioception and motor activation to allow for proper function of core, proximal hip and LE with self care and ADLs and functional mobility.   [] (04359) Gait Re-education- Provided training and instruction to the patient for proper LE, core and proximal hip recruitment and positioning and eccentric body weight control with ambulation re-education including up and down stairs     Home Exercise Program:    [x] (59040) Reviewed/Progressed HEP activities related to strengthening, flexibility, endurance, ROM of core, proximal hip and LE for functional self-care, mobility, lifting and ambulation/stair navigation   [] (52728)Reviewed/Progressed HEP activities related to improving balance, coordination, kinesthetic sense, posture, motor skill, proprioception of core, proximal hip and LE for self care, mobility, lifting, and ambulation/stair navigation      Manual Treatments:  PROM / STM / Oscillations-Mobs:  G-I, II, III, IV (PA's, Inf., Post.)  [x] (13835) Provided manual therapy to mobilize LE, proximal hip and/or LS spine soft tissue/joints for the purpose of modulating pain, promoting relaxation,  increasing ROM, reducing/eliminating soft tissue swelling/inflammation/restriction, improving soft tissue extensibility and allowing for proper ROM for normal function with self care, mobility, lifting and ambulation.      If [facility] Please Indicate Time In/Out  CPT Code Time in Time out   Eval     Ex 10:30 10:43   NMR 10:43 10:53   TA 10:53 11:03   Man 11:03 11:08   CP 11:08 11:23       Charges:  Timed Code Treatment Minutes: 38   Total Treatment Minutes: 53      [] EVAL (LOW) 60211 (typically 20 minutes face-to-face)  [] EVAL (MOD) 70073 (typically 30 minutes face-to-face)  [] EVAL (HIGH) 93298 (typically 45 minutes face-to-face)  [] RE-EVAL     [x] OT(14951) x     [] Dry needle 1 or 2 Muscles (89988)  [x] NMR (94981) x     [] Dry needle 3+ Muscles (50573)  [] Manual (60324) x     [] Ultrasound (77498) x  [x] TA (42666) x     [] Mech Traction (91622)  [] ES(attended) (26626)     [] ES (un) (72031):   [] Vasopump (16031) [] Ionto (64645)   [] Other:    GOALS:  *updated on 2/28  *sees MD today and anticipating potential RTW  *amb without brace, but tends to put ACE wrap on first thing in the AM until things loosen up and then takes it off later in the day (discussed option of compression sleeve/support for ankle)   *taking OTC ibuprofen as needed   *[place] [employer] activities (swimming and biking) without problem, but WB activities like walking x 30 min will inc pain  *hep daily   *pain avg 2-3/10 with typical day; overall feels 60% better  *feels strength is improving and ROM is 80% there but functionally still having trouble with prolonged amb, steps, household tasks like carrying laundry up/dwn steps    *ROM DF 0, PF 66, inv 28 active/32 passive, evr 22 active   *MMT: DF 5/5, inv 5/5, evr 5/5       Patient stated goal: \" walk without brace\"  [] Progressing: [x] Met: [] Not Met: [] Adjusted     Therapist goals for Patient:   Short Term Goals: To be achieved in: 2 weeks  1. Independent in HEP and progression per patient tolerance, in order to prevent re-injury. [] Progressing: [x] Met: [] Not Met: [] Adjusted  2. Patient will have a decrease in pain by 20-30% to facilitate improvement in movement, function, and ADLs as indicated by Functional Deficits. [] Progressing: [x] Met: [] Not Met: [] Adjusted     Long Term Goals: To be achieved in: at d/c   1. Improve WOMAC functional outcome score fby 10 points or more to assist with reaching prior level of function. [x] Progressing: [] Met: [] Not Met: [] Adjusted  2.  Patient will have a decrease in pain by 40-50% to facilitate improvement in movement, function, and ADLs as indicated by Functional Deficits.  [x] Progressing: [] Met: [] Not Met: [] Adjusted  3. Patient will demonstrate increased AROM to +5 DF, 35 inv, 25 evr to allow for proper joint functioning as indicated by patients Functional Deficits.   [x] Progressing: [] Met: [] Not Met: [] Adjusted  4. Patient will demonstrate an increase in Strength to 5/5 in R ankle/LE to allow for proper functional mobility as indicated by patients Functional Deficits.   [x] Progressing: [] Met: [] Not Met: [] Adjusted  5. Patient will return to amb without brace/AD without increased symptoms or restriction.   [] Progressing: [x] Met: [] Not Met: [] Adjusted  6. \"Work and sports\"(patient specific functional goal)    [x] Progressing: [] Met: [] Not Met: [] Adjusted         ASSESSMENT:  1/26: pt's s/s consistent with post surgical status R foot/ankle; skilled PT necessary for ROM/strength/gait  1/31: skilled PT to gradually progress WB, ROM, balance and strength; [place] new ex well   2/7: tolerated WB progression well today with skilled PT/supervision    2/9: pt belongs to [employer] and was able to go and swim a few laps of breaststroke and freestyle without much c/o; pt remains tight haile with DF ROM in ankle; cont skilled therapy for ROM and functional strength; first steps out of bed are getting a little better     2/14: gradually returning to normal ADLs and inc activity levels well; cont skilled therapy to progress functional strength also  2/21: tolerating progression of PT ex and gradual return to activity very well   2/28: inc pain with trial of prolonged walking today; will see MD for assessment today and pending RTW  3/2: modifications with POC due to cont soreness; will resume when able; pt to RTW light duty next week   3/7: guidance with skilled PT to resume all exercises today due to pain c/o were less and pt tolerated this well    Treatment/Activity Tolerance:  [x] Patient tolerated treatment well [] Patient limited by tae  [] Patient limited by  pain  [] Patient limited by other medical complications  [] Other:     Overall Progression Towards Functional goals/ Treatment Progress Update:  [x] Patient is progressing as expected towards functional goals listed. [] Progression is slowed due to complexities/Impairments listed. [] Progression has been slowed due to co-morbidities. [] Plan just implemented, too soon to assess goals progression <30days   [] Goals require adjustment due to lack of progress  [] Patient is not progressing as expected and requires additional follow up with physician  [] Other    Prognosis for POC: [x] Good [] Fair  [] Poor    Patient requires continued skilled intervention: [x] Yes  [] No        PLAN: Add as indicated above  [x] Continue per plan of care [] Alter current plan (see comments)  [] Plan of care initiated [] Hold pending MD visit [] Discharge    Electronically signed by: RETA Martinez [id number]    Note: If patient does not return for scheduled/recommended follow up visits, this note will serve as a discharge from care along with the most recent update on progress.

## 2023-03-09 ENCOUNTER — HOSPITAL ENCOUNTER (OUTPATIENT)
Dept: PHYSICAL THERAPY | Age: 58
Setting detail: THERAPIES SERIES
Discharge: HOME | End: 2023-03-09
Payer: COMMERCIAL

## 2023-03-09 PROCEDURE — 97110 THERAPEUTIC EXERCISES: CPT

## 2023-03-09 PROCEDURE — 97112 NEUROMUSCULAR REEDUCATION: CPT

## 2023-03-09 PROCEDURE — 97530 THERAPEUTIC ACTIVITIES: CPT

## 2023-03-09 NOTE — FLOWSHEET NOTE
[facility] - Outpatient Rehabilitation and Therapy, [facility]  [address] [place] [facility], [facility]  Phone: [phone]   Fax:     [phone]          Physical Therapy Treatment Note/ Progress Report:     Date:  2023    Patient Name:  Toñito Hair \"Ewa\"   :  1965  MRN: [id number]    Pertinent Medical History:     Medical/Treatment Diagnosis Information:  Medical Diagnosis: Closed displaced fracture of body of talus [S92.123A]  Treatment Diagnosis: pain, dec gait/ROM/strength limiting ADLs    Insurance/Certification information:  PT Insurance Information: [facility] 18 visit; end date extension 23-3/31/23  Physician Information:  Isabella Moscoso MD  Plan of care signed (Y/N): yes    Date of Patient follow up with Physician: May     Progress Report: []  Yes 23  [x]  No     Date Range for reporting period:  Beginnin2023  Ending:      Progress report due (10 Rx/or 30 days whichever is less): 3/21/00     Recertification due (POC duration/ or 90 days whichever is less):      Visit # POC/Insurance Allowable Auth Needed     18 visits   23-3/31/23 [x]Yes   []No     Latex Allergy:  [x]NO      []YES  Preferred Language for Healthcare:   [x]English       []Other:    Functional Scale:       Date assessed: at [facility]  Test:WOMAC  Score:Raw score 58  Update on visit 10 3/2/23: 24    Pain level:  2/10     History of Injury: R ankle/talus fx on 22 while stepping off pallet at work. She had ORIF on . Most pain is across top of foot ranges from 3-6/10 with c/o pain waking her at night. She is not icing, edu on benefits of ice daily; taking OTC ibuprofen; has started some light yoga/pilates and TB PF ex; denies N/T. Pt is off work right now.      SUBJECTIVE:  See eval  : just stiffness now with ibuprofen in her system; ankle circles hep is challenging, but overall feels pain intensity is getting better; to PT with 1 crutch and ace wrap on ankle   2/7: working through some AM stiffness  2/9: not much pain, more stiffness posterior ankle; pt belongs to [facility] and was able to go and swim a few laps of breaststroke and freestyle without much c/o   2/14: to PT without crutch and was able to take the steps up to PT (reciprocally); sore last night as she thinks she overdid it with swimming/yard work and trying to get back into normal ADLs, but ice and ibuprofen helped and she is only a little tender today   2/16: did a lot of yard work the past 2 days, pain is low but she can tell her ankle is more swollen  2/21: just annoying stiffness; tolerated a 1 hour outdoor bike ride on Sunday without c/o; gradually resuming gym ex and was able to do lunges without c/o; to PT with ankle ACE wrapped and states only wraps it in the AM  (some ex modified/held due to pt arrived 7 min late to session)   2/23: able to get up early and do a little ex; to PT without ACE wrap; night pain is less but still needs OTC ibuprofen first thing in AM but doesn't take it consistently throughout the day; plans to do another bike ride today    2/28: sees MD today and anticipating RTW she tried to walk today and a 30 min walk outside inc pain to 4/10; see PN - WOMAC next time at visit 10  3/2: cont soreness from her walk on Monday; saw MD [place]: to return to work 3/6 light duty with seated break every 30 min and will f/u with MD in 2 more months to discuss removal of hardware   3/7: back to work light duty and is in a completely different position where she can sit all day; ankle is feeling better and was able to take a walk at lunch yesterday without too much pain   3/9: pain is not bad at all, took a brisk 2 mile walk at lunch yesterday and iced and rested after and tolerated it well       OBJECTIVE:  Observation:   Test measurements:      RESTRICTIONS/PRECAUTIONS:     Exercises/Interventions:     Therapeutic Ex (84180)   Min:15 Reps/Resistance Notes/CUES  R ankle talus fx/ORIF   Airdyne Seat 4 x 5 min  Edu for hep including: AROM, calf and inv/evr str - see below   GSS incline  Sol stretch incline  3 x 20 sec  3 x 20 sec      MWM DF stretch on step 3/7: Improved ROM - less soreness    Standing HR hep   Fitter  1 thin f/b s/s x 10 each  Try s/s B feet on/skier   R SL squat w/ star/tap L  F/b x 2 reps  1 pole         Leg press    R SL press   R SL HR   30# 2 x 10   30# 2 x 10              TB 4 way    Hep - Pt has several bands at home  2/14: able to progress to next levels of bands at home         Therapeutic Activity (38649) Min:15    Step up fwd  Step down 8\" x 10  8\" x 10    Jog on mini trampoline jog X 30 sec     Amb in ll bars:   Iver Collazo walk    X 4 lengths      skip  Lat shuffle X 4 lengths  X 2  Across gym        NMR re-education (33669)  Min:10  Cues for tech as needed    Gumdrop    balance   upside up x 30 sec     Baps 4 way  L2 FWB x 10 cw/ccw      Bosu   Balance   Mini squat   X 45 sec  X 10      Rocker board f/b s/s    Airex   Step up R   SLS   Step up R x 5  X 30 sec         Manual Intervention (40653) Min: 5     Joint mobs   TC, subtalar, MT   Gentle grd 1-2     PROM 4 way X 10 each                    Modalities  Min:x 15 min      CP  Supine w/ wedge x 15 min                 Other Therapeutic Activities: Pt was educated on PT POC, Diagnosis, Prognosis, pathomechanics as well as frequency and duration of scheduling future physical therapy appointments. Time was also taken on this day to answer all patient questions and participation in PT. Reviewed appointment policy in detail with patient and patient verbalized understanding. Home Exercise Program: Patient was instructed in the following for HEP:     . Patient verbalized/demonstrated understanding and was issued written handout. Access Code: [email or web address]  URL: [email or web address]  Date: 01/26/2023  Prepared by: Geovanna Barrera     Exercises  Supine Single Leg Ankle Pumps - 1-2 x daily - 7 x weekly - 1 sets - 10 reps  Supine Ankle Circles - 1-2 x daily - 7 x weekly - 1 sets - 10 reps  Supine Ankle Inversion Eversion AROM - 1-2 x daily - 7 x weekly - 1 sets - 10 reps  Seated Toe Curl - 1-2 x daily - 7 x weekly - 1 sets - 10 reps  Towel Scrunches - 1-2 x daily - 7 x weekly - 1 sets - 10 reps  Ankle Alphabet in Elevation - 1-2 x daily - 7 x weekly - 1 sets - 10 reps  Long Sitting Calf Stretch with Strap - 1 x daily - 7 x weekly - 1 sets - 3-5 reps - 10 hold  Seated Ankle Inversion Eversion PROM - 1 x daily - 7 x weekly - 3 sets - 3-5 reps - 10 hold    Access Code: [email or web address]  URL: [email or web address]  Date: 01/31/2023  Prepared by: Epimenio Bussing    Exercises  Seated Heel Raise - 1 x daily - 7 x weekly - 1 sets - 10 reps  Seated Toe Raise - 1 x daily - 7 x weekly - 1 sets - 10 reps  Seated Ankle Inversion AROM - 1 x daily - 7 x weekly - 1 sets - 10 reps  Seated Ankle Inversion Eversion AROM - 1 x daily - 7 x weekly - 1 sets - 10 reps  Seated Heel Slide - 1 x daily - 7 x weekly - 1 sets - 10 reps    Access Code: [email or web address]  URL: [email or web address]  Date: 02/07/2023  Prepared by: Epimenio Bussing    Exercises  Ankle Dorsiflexion with Resistance - 1 x daily - 7 x weekly - 1 sets - 10 reps  Seated Ankle Dorsiflexion with Resistance - 1 x daily - 7 x weekly - 1 sets - 10 reps  Ankle and Toe Plantarflexion with Resistance - 1 x daily - 7 x weekly - 1 sets - 10 reps  Seated Ankle Inversion with Resistance and Legs Crossed - 1 x daily - 7 x weekly - 1 sets - 10 reps  Long Sitting Ankle Eversion with Resistance - 1 x daily - 7 x weekly - 1 sets - 10 reps        Therapeutic Exercise and NMR EXR  [x] (16099) Provided verbal/tactile cueing for activities related to strengthening, flexibility, endurance, ROM for improvements in LE, proximal hip, and core control with self care, mobility, lifting, ambulation.  [] (25517) Provided verbal/tactile cueing for activities related to improving balance, coordination, kinesthetic sense, posture, motor skill, proprioception  to assist with LE, proximal hip, and core control in self care, mobility, lifting, ambulation and eccentric single leg control. [address] and Therapeutic Activities:    [x] (65813 or 73255) Provided verbal/tactile cueing for activities related to improving balance, coordination, kinesthetic sense, posture, motor skill, proprioception and motor activation to allow for proper function of core, proximal hip and LE with self care and ADLs and functional mobility.   [] (41247) Gait Re-education- Provided training and instruction to the patient for proper LE, core and proximal hip recruitment and positioning and eccentric body weight control with ambulation re-education including up and down stairs     Home Exercise Program:    [x] (62854) Reviewed/Progressed HEP activities related to strengthening, flexibility, endurance, ROM of core, proximal hip and LE for functional self-care, mobility, lifting and ambulation/stair navigation   [] (57603)Reviewed/Progressed HEP activities related to improving balance, coordination, kinesthetic sense, posture, motor skill, proprioception of core, proximal hip and LE for self care, mobility, lifting, and ambulation/stair navigation      Manual Treatments:  PROM / STM / Oscillations-Mobs:  G-I, II, III, IV (PA's, Inf., Post.)  [x] (57520) Provided manual therapy to mobilize LE, proximal hip and/or LS spine soft tissue/joints for the purpose of modulating pain, promoting relaxation,  increasing ROM, reducing/eliminating soft tissue swelling/inflammation/restriction, improving soft tissue extensibility and allowing for proper ROM for normal function with self care, mobility, lifting and ambulation.      If BWC Please Indicate Time In/Out  CPT Code Time in Time out   Eval     Ex 11:30 11:45   NMR 11:45 11:55   TA 11:55 12:10   Man 12:10 12:15   CP 12:15 12:30       Charges:  Timed Code Treatment Minutes: 45   Total Treatment Minutes: 60      [] EVAL (LOW) 52935 (typically 20 minutes face-to-face)  [] EVAL (MOD) 49765 (typically 30 minutes face-to-face)  [] EVAL (HIGH) 78367 (typically 45 minutes face-to-face)  [] RE-EVAL     [x] LA(63457) x     [] Dry needle 1 or 2 Muscles (31392)  [x] NMR (74578) x     [] Dry needle 3+ Muscles (73985)  [] Manual (70991) x     [] Ultrasound (54039) x  [x] TA (59470) x     [] Mech Traction (74234)  [] ES(attended) (47576)     [] ES (un) (07543):   [] Vasopump (34836) [] Ionto (76583)   [] Other:    GOALS:  *updated on 2/28  *sees MD today and anticipating potential RTW  *amb without brace, but tends to put ACE wrap on first thing in the AM until things loosen up and then takes it off later in the day (discussed option of compression sleeve/support for ankle)   *taking OTC ibuprofen as needed   *[place] [employer] activities (swimming and biking) without problem, but WB activities like walking x 30 min will inc pain  *hep daily   *pain avg 2-3/10 with typical day; overall feels 60% better  *feels strength is improving and ROM is 80% there but functionally still having trouble with prolonged amb, steps, household tasks like carrying laundry up/dwn steps    *ROM DF 0, PF 66, inv 28 active/32 passive, evr 22 active   *MMT: DF 5/5, inv 5/5, evr 5/5       Patient stated goal: \" walk without brace\"  [] Progressing: [x] Met: [] Not Met: [] Adjusted     Therapist goals for Patient:   Short Term Goals: To be achieved in: 2 weeks  1. Independent in HEP and progression per patient tolerance, in order to prevent re-injury. [] Progressing: [x] Met: [] Not Met: [] Adjusted  2. Patient will have a decrease in pain by 20-30% to facilitate improvement in movement, function, and ADLs as indicated by Functional Deficits. [] Progressing: [x] Met: [] Not Met: [] Adjusted     Long Term Goals: To be achieved in: at d/c   1.  Improve WOMAC functional outcome score fby 10 points or more to assist with reaching prior level of function. [x] Progressing: [] Met: [] Not Met: [] Adjusted  2. Patient will have a decrease in pain by 40-50% to facilitate improvement in movement, function, and ADLs as indicated by Functional Deficits. [x] Progressing: [] Met: [] Not Met: [] Adjusted  3. Patient will demonstrate increased AROM to +5 DF, 35 inv, 25 evr to allow for proper joint functioning as indicated by patients Functional Deficits. [x] Progressing: [] Met: [] Not Met: [] Adjusted  4. Patient will demonstrate an increase in Strength to 5/5 in R ankle/LE to allow for proper functional mobility as indicated by patients Functional Deficits. [x] Progressing: [] Met: [] Not Met: [] Adjusted  5. Patient will return to amb without brace/AD without increased symptoms or restriction. [] Progressing: [x] Met: [] Not Met: [] Adjusted  6.  \"Work and sports\"(patient specific functional goal)    [x] Progressing: [] Met: [] Not Met: [] Adjusted         ASSESSMENT:  1/26: pt's s/s consistent with post surgical status R foot/ankle; skilled PT necessary for ROM/strength/gait  1/31: skilled PT to gradually progress WB, ROM, balance and strength; [place] new ex well   2/7: tolerated WB progression well today with skilled PT/supervision    2/9: pt belongs to [facility] and was able to go and swim a few laps of breaststroke and freestyle without much c/o; pt remains tight haile with DF ROM in ankle; cont skilled therapy for ROM and functional strength; first steps out of bed are getting a little better     2/14: gradually returning to normal ADLs and inc activity levels well; cont skilled therapy to progress functional strength also  2/21: tolerating progression of PT ex and gradual return to activity very well   2/28: inc pain with trial of prolonged walking today; will see MD for assessment today and pending RTW  3/2: modifications with POC due to cont soreness; will resume when able; pt to RTW light duty next week   3/7: guidance with skilled PT to resume all exercises today due to pain c/o were less and pt tolerated this well    Treatment/Activity Tolerance:  [x] Patient tolerated treatment well [] Patient limited by fatique  [] Patient limited by pain  [] Patient limited by other medical complications  [] Other:     Overall Progression Towards Functional goals/ Treatment Progress Update:  [x] Patient is progressing as expected towards functional goals listed. [] Progression is slowed due to complexities/Impairments listed. [] Progression has been slowed due to co-morbidities. [] Plan just implemented, too soon to assess goals progression <30days   [] Goals require adjustment due to lack of progress  [] Patient is not progressing as expected and requires additional follow up with physician  [] Other    Prognosis for POC: [x] Good [] Fair  [] Poor    Patient requires continued skilled intervention: [x] Yes  [] No        PLAN: Add as indicated above  [x] Continue per plan of care [] Alter current plan (see comments)  [] Plan of care initiated [] Hold pending MD visit [] Discharge    Electronically signed by: Talya Haddad, PTMPT [id number]    Note: If patient does not return for scheduled/recommended follow up visits, this note will serve as a discharge from care along with the most recent update on progress.

## 2023-03-14 ENCOUNTER — HOSPITAL ENCOUNTER (OUTPATIENT)
Dept: PHYSICAL THERAPY | Age: 58
Setting detail: THERAPIES SERIES
Discharge: HOME | End: 2023-03-14
Payer: COMMERCIAL

## 2023-03-14 PROCEDURE — 97530 THERAPEUTIC ACTIVITIES: CPT

## 2023-03-14 PROCEDURE — 97110 THERAPEUTIC EXERCISES: CPT

## 2023-03-14 PROCEDURE — 97112 NEUROMUSCULAR REEDUCATION: CPT

## 2023-03-14 NOTE — FLOWSHEET NOTE
[facility] - Outpatient Rehabilitation and Therapy, [address] [address]  Phone: [phone]   Fax:     [phone]          Physical Therapy Treatment Note/ Progress Report:     Date:  2023    Patient Name:  Mariya Herndon \"Ewa\"   :  1965  MRN: [id number]    Pertinent Medical History:     Medical/Treatment Diagnosis Information:  Medical Diagnosis: Closed displaced fracture of body of talus [S92.123A]  Treatment Diagnosis: pain, dec gait/ROM/strength limiting ADLs    Insurance/Certification information:  PT Insurance Information: [facility] - 18 visit; end date extension 23-3/31/23  Physician Information:  Benjie Reardon MD  Plan of care signed (Y/N): yes    Date of Patient follow up with Physician: May     Progress Report: []  Yes 23  [x]  No     Date Range for reporting period:  Beginnin2023  Ending:      Progress report due (10 Rx/or 30 days whichever is less): 23     Recertification due (POC duration/ or 90 days whichever is less):      Visit # POC/Insurance Allowable Auth Needed     18 visits   23-3/31/23 [x]Yes   []No     Latex Allergy:  [x]NO      []YES  Preferred Language for Healthcare:   [x]English       []Other:    Functional Scale:       Date assessed: at eval  Test:WOMAC  Score:Raw score 58  Update on visit 10 3/2/23: 24    Pain level:  210     History of Injury: R ankle/talus fx on 22 while stepping off pallet at work.  She had ORIF on .  Most pain is across top of foot ranges from 3-6/10 with c/o pain waking her at night. She is not icing, edu on benefits of ice daily; taking OTC ibuprofen; has started some light yoga/pilates and TB PF ex; denies N/T. Pt is off work right now.     SUBJECTIVE:  See eval  : just stiffness now with ibuprofen in her system; ankle circles hep is challenging, but overall feels pain intensity is getting better;  to PT with 1 crutch and ace wrap on ankle   2/7: working through some AM stiffness  2/9: not much pain, more stiffness posterior ankle; pt belongs to [facility] and was able to go and swim a few laps of breaststroke and freestyle without much c/o   2/14: to PT without crutch and was able to take the steps up to PT (reciprocally); sore last night as she thinks she overdid it with swimming/yard work and trying to get back into normal ADLs, but ice and ibuprofen helped and she is only a little tender today   2/16: did a lot of yard work the past 2 days, pain is low but she can tell her ankle is more swollen  2/21: just annoying stiffness; tolerated a 1 hour outdoor bike ride on Sunday without c/o; gradually resuming gym ex and was able to do lunges without c/o; to PT with ankle ACE wrapped and states only wraps it in the AM  (some ex modified/held due to pt arrived 7 min late to session)   2/23: able to get up early and do a little ex; to PT without ACE wrap; night pain is less but still needs OTC ibuprofen first thing in AM but doesn't take it consistently throughout the day; plans to do another bike ride today    2/28: sees MD today and anticipating RTW she tried to walk today and a 30 min walk outside inc pain to 4/10; see PN - WOMAC next time at visit 10  3/2: cont soreness from her walk on Monday; saw MD [place]: to return to work 3/6 light duty with seated break every 30 min and will f/u with MD in 2 more months to discuss removal of hardware   3/7: back to work light duty and is in a completely different position where she can sit all day; ankle is feeling better and was able to take a walk at lunch yesterday without too much pain   3/9: pain is not bad at all, took a brisk 2 mile walk at lunch yesterday and iced and rested after and tolerated it well   3/14: stiff this AM from walking too much yesterday       OBJECTIVE:  Observation:   Test measurements:      RESTRICTIONS/PRECAUTIONS:     Exercises/Interventions: Therapeutic Ex (14375)   Min:13 Reps/Resistance Notes/CUES  R ankle talus fx/ORIF   Airdyne Seat 4 x 5 min  Edu for hep including: AROM, calf and inv/evr str - see below   GSS incline  Sol stretch incline  3 x 20 sec  3 x 20 sec      MWM DF stretch on step 3/7: Improved ROM - less soreness    Standing HR hep   Fitter  1 thin f/b x 10   s/s B feet on 1 thin x 10     R SL squat w/ star/tap L  F/b x 2 reps  1 pole         Leg press    R SL press   R SL HR   30# 2 x 10   30# 2 x 10              TB 4 way    Hep - Pt has several bands at home  2/14: able to progress to next levels of bands at home         Therapeutic Activity (86688) Min:12    Step up fwd/jog  Step down 8\" x 10  8\" x 10    Jog on mini trampoline jog X 30 sec     Amb length of gym:   Ann Marie Lamp shuffle   skip   X 2 lengths   X 2 lengths  X 2 lengths               NMR re-education (57390)  Min:10  Cues for tech as needed    Gumdrop    Balance SLS R only    upside up x 30 sec     Baps  L2 FWB x 10 cw/ccw      Bosu   Balance   Mini squat   X 45 sec  X 10      Rocker board f/b s/s    Airex   Step up R   SLS   Step up R x 5  X 30 sec         Manual Intervention (19794) Min: 5     Joint mobs   TC, subtalar, MT   Gentle grd 1-2     PROM 4 way X 10 each                    Modalities  Min:x 15 min      CP  Supine w/ wedge x 15 min                 Other Therapeutic Activities: Pt was educated on PT POC, Diagnosis, Prognosis, pathomechanics as well as frequency and duration of scheduling future physical therapy appointments. Time was also taken on this day to answer all patient questions and participation in PT. Reviewed appointment policy in detail with patient and patient verbalized understanding. Home Exercise Program: Patient was instructed in the following for HEP:     . Patient verbalized/demonstrated understanding and was issued written handout. Access Code: [email or web address]  URL: [email or web address]  Date: 01/26/2023  Prepared by: Megan Henderson Knoll     Exercises  Supine Single Leg Ankle Pumps - 1-2 x daily - 7 x weekly - 1 sets - 10 reps  Supine Ankle Circles - 1-2 x daily - 7 x weekly - 1 sets - 10 reps  Supine Ankle Inversion Eversion AROM - 1-2 x daily - 7 x weekly - 1 sets - 10 reps  Seated Toe Curl - 1-2 x daily - 7 x weekly - 1 sets - 10 reps  Towel Scrunches - 1-2 x daily - 7 x weekly - 1 sets - 10 reps  Ankle Alphabet in Elevation - 1-2 x daily - 7 x weekly - 1 sets - 10 reps  Long Sitting Calf Stretch with Strap - 1 x daily - 7 x weekly - 1 sets - 3-5 reps - 10 hold  Seated Ankle Inversion Eversion PROM - 1 x daily - 7 x weekly - 3 sets - 3-5 reps - 10 hold    Access Code: [email or web address]  URL: [email or web address]  Date: 01/31/2023  Prepared by: Mukesh Cruzm    Exercises  Seated Heel Raise - 1 x daily - 7 x weekly - 1 sets - 10 reps  Seated Toe Raise - 1 x daily - 7 x weekly - 1 sets - 10 reps  Seated Ankle Inversion AROM - 1 x daily - 7 x weekly - 1 sets - 10 reps  Seated Ankle Inversion Eversion AROM - 1 x daily - 7 x weekly - 1 sets - 10 reps  Seated Heel Slide - 1 x daily - 7 x weekly - 1 sets - 10 reps    Access Code: [email or web address]  URL: [email or web address]  Date: 02/07/2023  Prepared by: Mayorga Ohm    Exercises  Ankle Dorsiflexion with Resistance - 1 x daily - 7 x weekly - 1 sets - 10 reps  Seated Ankle Dorsiflexion with Resistance - 1 x daily - 7 x weekly - 1 sets - 10 reps  Ankle and Toe Plantarflexion with Resistance - 1 x daily - 7 x weekly - 1 sets - 10 reps  Seated Ankle Inversion with Resistance and Legs Crossed - 1 x daily - 7 x weekly - 1 sets - 10 reps  Long Sitting Ankle Eversion with Resistance - 1 x daily - 7 x weekly - 1 sets - 10 reps        Therapeutic Exercise and NMR EXR  [x] (30052) Provided verbal/tactile cueing for activities related to strengthening, flexibility, endurance, ROM for improvements in LE, proximal hip, and core control with self care, mobility, lifting, ambulation.  [] (31898) Provided verbal/tactile cueing for activities related to improving balance, coordination, kinesthetic sense, posture, motor skill, proprioception  to assist with LE, proximal hip, and core control in self care, mobility, lifting, ambulation and eccentric single leg control. [address] and Therapeutic Activities:    [x] (84536 or 22687) Provided verbal/tactile cueing for activities related to improving balance, coordination, kinesthetic sense, posture, motor skill, proprioception and motor activation to allow for proper function of core, proximal hip and LE with self care and ADLs and functional mobility.   [] (58271) Gait Re-education- Provided training and instruction to the patient for proper LE, core and proximal hip recruitment and positioning and eccentric body weight control with ambulation re-education including up and down stairs     Home Exercise Program:    [x] (81257) Reviewed/Progressed HEP activities related to strengthening, flexibility, endurance, ROM of core, proximal hip and LE for functional self-care, mobility, lifting and ambulation/stair navigation   [] (82344)Reviewed/Progressed HEP activities related to improving balance, coordination, kinesthetic sense, posture, motor skill, proprioception of core, proximal hip and LE for self care, mobility, lifting, and ambulation/stair navigation      Manual Treatments:  PROM / STM / Oscillations-Mobs:  G-I, II, III, IV (PA's, Inf., Post.)  [x] (35383) Provided manual therapy to mobilize LE, proximal hip and/or LS spine soft tissue/joints for the purpose of modulating pain, promoting relaxation,  increasing ROM, reducing/eliminating soft tissue swelling/inflammation/restriction, improving soft tissue extensibility and allowing for proper ROM for normal function with self care, mobility, lifting and ambulation.      If BWC Please Indicate Time In/Out  CPT Code Time in Time out   Eval     Ex 10:30 11:43   NMR 10:43 10:53   TA 10:53 11:05   Man 11:05 11:10   CP 11:10 11:25       Charges:  Timed Code Treatment Minutes: 40   Total Treatment Minutes: 55      [] EVAL (LOW) 32781 (typically 20 minutes face-to-face)  [] EVAL (MOD) 65063 (typically 30 minutes face-to-face)  [] EVAL (HIGH) 37601 (typically 45 minutes face-to-face)  [] RE-EVAL     [x] QS(73455) x     [] Dry needle 1 or 2 Muscles (38523)  [x] NMR (38201) x     [] Dry needle 3+ Muscles (89537)  [] Manual (28645) x     [] Ultrasound (50126) x  [x] TA (55769) x     [] Mech Traction (76098)  [] ES(attended) (79543)     [] ES (un) (65571):   [] Vasopump (05924) [] Ionto (65308)   [] Other:    GOALS:  *updated on 2/28  *sees MD today and anticipating potential RTW  *amb without brace, but tends to put ACE wrap on first thing in the AM until things loosen up and then takes it off later in the day (discussed option of compression sleeve/support for ankle)   *taking OTC ibuprofen as needed   *[place] [employer] activities (swimming and biking) without problem, but WB activities like walking x 30 min will inc pain  *hep daily   *pain avg 2-3/10 with typical day; overall feels 60% better  *feels strength is improving and ROM is 80% there but functionally still having trouble with prolonged amb, steps, household tasks like carrying laundry up/dwn steps    *ROM DF 0, PF 66, inv 28 active/32 passive, evr 22 active   *MMT: DF 5/5, inv 5/5, evr 5/5       Patient stated goal: \" walk without brace\"  [] Progressing: [x] Met: [] Not Met: [] Adjusted     Therapist goals for Patient:   Short Term Goals: To be achieved in: 2 weeks  1. Independent in HEP and progression per patient tolerance, in order to prevent re-injury. [] Progressing: [x] Met: [] Not Met: [] Adjusted  2. Patient will have a decrease in pain by 20-30% to facilitate improvement in movement, function, and ADLs as indicated by Functional Deficits. [] Progressing: [x] Met: [] Not Met: [] Adjusted     Long Term Goals: To be achieved in: at d/c   1.  Improve WOMAC functional outcome score fby 10 points or more to assist with reaching prior level of function. [x] Progressing: [] Met: [] Not Met: [] Adjusted  2. Patient will have a decrease in pain by 40-50% to facilitate improvement in movement, function, and ADLs as indicated by Functional Deficits. [x] Progressing: [] Met: [] Not Met: [] Adjusted  3. Patient will demonstrate increased AROM to +5 DF, 35 inv, 25 evr to allow for proper joint functioning as indicated by patients Functional Deficits. [x] Progressing: [] Met: [] Not Met: [] Adjusted  4. Patient will demonstrate an increase in Strength to 5/5 in R ankle/LE to allow for proper functional mobility as indicated by patients Functional Deficits. [x] Progressing: [] Met: [] Not Met: [] Adjusted  5. Patient will return to amb without brace/AD without increased symptoms or restriction. [] Progressing: [x] Met: [] Not Met: [] Adjusted  6.  \"Work and sports\"(patient specific functional goal)    [x] Progressing: [] Met: [] Not Met: [] Adjusted         ASSESSMENT:  1/26: pt's s/s consistent with post surgical status R foot/ankle; skilled PT necessary for ROM/strength/gait  1/31: skilled PT to gradually progress WB, ROM, balance and strength; [place] new ex well   2/7: tolerated WB progression well today with skilled PT/supervision    2/9: pt belongs to [facility] and was able to go and swim a few laps of breaststroke and freestyle without much c/o; pt remains tight haile with DF ROM in ankle; cont skilled therapy for ROM and functional strength; first steps out of bed are getting a little better     2/14: gradually returning to normal ADLs and inc activity levels well; cont skilled therapy to progress functional strength also  2/21: tolerating progression of PT ex and gradual return to activity very well   2/28: inc pain with trial of prolonged walking today; will see MD for assessment today and pending RTW  3/2: modifications with POC due to cont soreness; will resume when able; pt to RTW light duty next week   3/7: guidance with skilled PT to resume all exercises today due to pain c/o were less and pt tolerated this well  3/14: progressing ex with increased impact as tolerated     Treatment/Activity Tolerance:  [x] Patient tolerated treatment well [] Patient limited by fatique  [] Patient limited by pain  [] Patient limited by other medical complications  [] Other:     Overall Progression Towards Functional goals/ Treatment Progress Update:  [x] Patient is progressing as expected towards functional goals listed. [] Progression is slowed due to complexities/Impairments listed. [] Progression has been slowed due to co-morbidities. [] Plan just implemented, too soon to assess goals progression <30days   [] Goals require adjustment due to lack of progress  [] Patient is not progressing as expected and requires additional follow up with physician  [] Other    Prognosis for POC: [x] Good [] Fair  [] Poor    Patient requires continued skilled intervention: [x] Yes  [] No        PLAN: Add as indicated above  [x] Continue per plan of care [] Alter current plan (see comments)  [] Plan of care initiated [] Hold pending MD visit [] Discharge    Electronically signed by: Rajinder Davis, PTMPT [id number]    Note: If patient does not return for scheduled/recommended follow up visits, this note will serve as a discharge from care along with the most recent update on progress.

## 2023-03-16 ENCOUNTER — HOSPITAL ENCOUNTER (OUTPATIENT)
Dept: PHYSICAL THERAPY | Age: 58
Setting detail: THERAPIES SERIES
Discharge: HOME | End: 2023-03-16
Payer: COMMERCIAL

## 2023-03-16 PROCEDURE — 97530 THERAPEUTIC ACTIVITIES: CPT

## 2023-03-16 PROCEDURE — 97110 THERAPEUTIC EXERCISES: CPT

## 2023-03-16 PROCEDURE — 97112 NEUROMUSCULAR REEDUCATION: CPT

## 2023-03-16 NOTE — FLOWSHEET NOTE
[facility] - Outpatient Rehabilitation and Therapy, [facility]  [address] [facility], [facility], [place]  Phone: [phone]   Fax:     [phone]          Physical Therapy Treatment Note/ Progress Report:     Date:  2023    Patient Name:  Hank Calabrese \"Ewa\"   :  1965  MRN: [id number]    Pertinent Medical History:     Medical/Treatment Diagnosis Information:  Medical Diagnosis: Closed displaced fracture of body of talus [S92.123A]  Treatment Diagnosis: pain, dec gait/ROM/strength limiting ADLs    Insurance/Certification information:  PT Insurance Information: [facility] - 18 visit; end date extension 23-3/31/23  Physician Information:  Danielle Orosco MD  Plan of care signed (Y/N): yes    Date of Patient follow up with Physician: May     Progress Report: []  Yes 23  [x]  No     Date Range for reporting period:  Beginnin2023  Ending:      Progress report due (10 Rx/or 30 days whichever is less):      Recertification due (POC duration/ or 90 days whichever is less):      Visit # POC/Insurance Allowable Auth Needed     18 visits   23-3/31/23 [x]Yes   []No     Latex Allergy:  [x]NO      []YES  Preferred Language for Healthcare:   [x]English       []Other:    Functional Scale:       Date assessed: at eval  Test:WOMAC  Score:Raw score 58  Update on visit 10 3/2/23: 24    Pain level:  1-2/10     History of Injury: R ankle/talus fx on 22 while stepping off pallet at work. She had ORIF on . Most pain is across top of foot ranges from 3-6/10 with c/o pain waking her at night. She is not icing, edu on benefits of ice daily; taking OTC ibuprofen; has started some light yoga/pilates and TB PF ex; denies N/T. Pt is off work right now.      SUBJECTIVE:  See eval  : just stiffness now with ibuprofen in her system; ankle circles hep is challenging, but overall feels pain intensity is getting better; to PT with 1 crutch and ace wrap on ankle   2/7: working through some AM stiffness  2/9: not much pain, more stiffness posterior ankle; pt belongs to [facility] and was able to go and swim a few laps of breaststroke and freestyle without much c/o   2/14: to PT without crutch and was able to take the steps up to PT (reciprocally); sore last night as she thinks she overdid it with swimming/yard work and trying to get back into normal ADLs, but ice and ibuprofen helped and she is only a little tender today   2/16: did a lot of yard work the past 2 days, pain is low but she can tell her ankle is more swollen  2/21: just annoying stiffness; tolerated a 1 hour outdoor bike ride on Sunday without c/o; gradually resuming gym ex and was able to do lunges without c/o; to PT with ankle ACE wrapped and states only wraps it in the AM  (some ex modified/held due to pt arrived 7 min late to session)   2/23: able to get up early and do a little ex; to PT without ACE wrap; night pain is less but still needs OTC ibuprofen first thing in AM but doesn't take it consistently throughout the day; plans to do another bike ride today    2/28: sees MD today and anticipating RTW she tried to walk today and a 30 min walk outside inc pain to 4/10; see PN - WOMAC next time at visit 10  3/2: cont soreness from her walk on Monday; saw MD [place]: to return to work 3/6 light duty with seated break every 30 min and will f/u with MD in 2 more months to discuss removal of hardware   3/7: back to work light duty and is in a completely different position where she can sit all day; ankle is feeling better and was able to take a walk at lunch yesterday without too much pain   3/9: pain is not bad at all, took a brisk 2 mile walk at lunch yesterday and iced and rested after and tolerated it well   3/14: stiff this AM from walking too much yesterday   3/16: normal morning stiffness/soreness      OBJECTIVE:  Observation:   Test measurements:     RESTRICTIONS/PRECAUTIONS:     Exercises/Interventions:     Therapeutic Ex (85812)   Min:13 Reps/Resistance Notes/CUES  R ankle talus fx/ORIF   Airdyne Seat 4 x 5 min  Edu for hep including: AROM, calf and inv/evr str - see below   GSS incline  Sol stretch incline  3 x 20 sec  3 x 20 sec      MWM DF stretch on step 3/7: Improved ROM - less soreness    Standing HR hep   Fitter  1 thin f/b s/s x 10 each  Didn't feel much with B feet on s/s   R SL squat w/ star/tap L  F/b x 2 reps  1 pole         Leg press    R SL press   R SL HR   30# 2 x 10   30# 2 x 10              TB 4 way    Hep - Pt has several bands at home  2/14: able to progress to next levels of bands at home         Therapeutic Activity (63998) Min:12    Step up fwd/jog  Step down 8\" x 10  8\" x 10    Jog on mini trampoline jog X 30 sec     Amb length of gym:   Carioca    Lat shuffle   skip   X 2 lengths   X 2 lengths  X 2 lengths     Carry crate to replicate work duties 25# x 2 laps          NMR re-education (36581)  Min:10  Cues for tech as needed    Gumdrop    Balance SLS R only    upside up x 30 sec     Baps  L2 FWB x 10 cw/ccw      Bosu   Balance   Mini squat   X 45 sec  X 10      Rocker board f/b s/s    Airex   Step up R   SLS   Step up R x 5  X 30 sec         Manual Intervention (62271) Min: 3     Joint mobs   TC, subtalar, MT   Gentle grd 1-2     PROM 4 way X 10 each                    Modalities  Min:x 15 min      CP  Supine w/ wedge x 15 min                 Other Therapeutic Activities: Pt was educated on PT POC, Diagnosis, Prognosis, pathomechanics as well as frequency and duration of scheduling future physical therapy appointments. Time was also taken on this day to answer all patient questions and participation in PT. Reviewed appointment policy in detail with patient and patient verbalized understanding.     Home Exercise Program: Patient was instructed in the following for HEP:     . Patient verbalized/demonstrated understanding and was  issued written handout. Access Code: [email or web address]  URL: [employer]/  Date: 01/26/2023  Prepared by: Zachary Grey     Exercises  Supine Single Leg Ankle Pumps - 1-2 x daily - 7 x weekly - 1 sets - 10 reps  Supine Ankle Circles - 1-2 x daily - 7 x weekly - 1 sets - 10 reps  Supine Ankle Inversion Eversion AROM - 1-2 x daily - 7 x weekly - 1 sets - 10 reps  Seated Toe Curl - 1-2 x daily - 7 x weekly - 1 sets - 10 reps  Towel Scrunches - 1-2 x daily - 7 x weekly - 1 sets - 10 reps  Ankle Alphabet in Elevation - 1-2 x daily - 7 x weekly - 1 sets - 10 reps  Long Sitting Calf Stretch with Strap - 1 x daily - 7 x weekly - 1 sets - 3-5 reps - 10 hold  Seated Ankle Inversion Eversion PROM - 1 x daily - 7 x weekly - 3 sets - 3-5 reps - 10 hold    Access Code: [email or web address]  URL: [employer]/  Date: 01/31/2023  Prepared by: Zachary Grey    Exercises  Seated Heel Raise - 1 x daily - 7 x weekly - 1 sets - 10 reps  Seated Toe Raise - 1 x daily - 7 x weekly - 1 sets - 10 reps  Seated Ankle Inversion AROM - 1 x daily - 7 x weekly - 1 sets - 10 reps  Seated Ankle Inversion Eversion AROM - 1 x daily - 7 x weekly - 1 sets - 10 reps  Seated Heel Slide - 1 x daily - 7 x weekly - 1 sets - 10 reps    Access Code: [email or web address]  URL: [email or web address]  Date: 02/07/2023  Prepared by: Zachary Grey    Exercises  Ankle Dorsiflexion with Resistance - 1 x daily - 7 x weekly - 1 sets - 10 reps  Seated Ankle Dorsiflexion with Resistance - 1 x daily - 7 x weekly - 1 sets - 10 reps  Ankle and Toe Plantarflexion with Resistance - 1 x daily - 7 x weekly - 1 sets - 10 reps  Seated Ankle Inversion with Resistance and Legs Crossed - 1 x daily - 7 x weekly - 1 sets - 10 reps  Long Sitting Ankle Eversion with Resistance - 1 x daily - 7 x weekly - 1 sets - 10 reps        Therapeutic Exercise and NMR EXR  [x] (12074) Provided verbal/tactile cueing for activities related to strengthening, flexibility, endurance, ROM for improvements in LE, proximal hip, and core control with self care, mobility, lifting, ambulation.  [] (02306) Provided verbal/tactile cueing for activities related to improving balance, coordination, kinesthetic sense, posture, motor skill, proprioception  to assist with LE, proximal hip, and core control in self care, mobility, lifting, ambulation and eccentric single leg control. [address] and Therapeutic Activities:    [x] (45840 or 60403) Provided verbal/tactile cueing for activities related to improving balance, coordination, kinesthetic sense, posture, motor skill, proprioception and motor activation to allow for proper function of core, proximal hip and LE with self care and ADLs and functional mobility.   [] (98666) Gait Re-education- Provided training and instruction to the patient for proper LE, core and proximal hip recruitment and positioning and eccentric body weight control with ambulation re-education including up and down stairs     Home Exercise Program:    [x] (82760) Reviewed/Progressed HEP activities related to strengthening, flexibility, endurance, ROM of core, proximal hip and LE for functional self-care, mobility, lifting and ambulation/stair navigation   [] (89565)Reviewed/Progressed HEP activities related to improving balance, coordination, kinesthetic sense, posture, motor skill, proprioception of core, proximal hip and LE for self care, mobility, lifting, and ambulation/stair navigation      Manual Treatments:  PROM / STM / Oscillations-Mobs:  G-I, II, III, IV (PA's, Inf., Post.)  [x] (79383) Provided manual therapy to mobilize LE, proximal hip and/or LS spine soft tissue/joints for the purpose of modulating pain, promoting relaxation,  increasing ROM, reducing/eliminating soft tissue swelling/inflammation/restriction, improving soft tissue extensibility and allowing for proper ROM for normal function with self care, mobility, lifting and ambulation.      If [facility] Please Indicate Time In/Out  CPT Code Time in Time out   Eval     Ex 10:30 11:43   NMR 10:43 10:53   TA 10:53 11:05   Man 11:05 11:08   CP 11:08 11:23       Charges:  Timed Code Treatment Minutes: 38   Total Treatment Minutes: 53      [] EVAL (LOW) 19829 (typically 20 minutes face-to-face)  [] EVAL (MOD) 00419 (typically 30 minutes face-to-face)  [] EVAL (HIGH) 75430 (typically 45 minutes face-to-face)  [] RE-EVAL     [x] PX(36822) x     [] Dry needle 1 or 2 Muscles (58256)  [x] NMR (98443) x     [] Dry needle 3+ Muscles (78703)  [] Manual (37623) x     [] Ultrasound (02266) x  [x] TA (35637) x     [] Mech Traction (21450)  [] ES(attended) (71127)     [] ES (un) (58834):   [] Vasopump (99263) [] Ionto (16852)   [] Other:    GOALS:  *updated on 2/28  *sees MD today and anticipating potential RTW  *amb without brace, but tends to put ACE wrap on first thing in the AM until things loosen up and then takes it off later in the day (discussed option of compression sleeve/support for ankle)   *taking OTC ibuprofen as needed   *[place] NWB activities (swimming and biking) without problem, but WB activities like walking x 30 min will inc pain  *hep daily   *pain avg 2-3/10 with typical day; overall feels 60% better  *feels strength is improving and ROM is 80% there but functionally still having trouble with prolonged amb, steps, household tasks like carrying laundry up/dwn steps    *ROM DF 0, PF 66, inv 28 active/32 passive, evr 22 active   *MMT: DF 5/5, inv 5/5, evr 5/5       Patient stated goal: \" walk without brace\"  [] Progressing: [x] Met: [] Not Met: [] Adjusted     Therapist goals for Patient:   Short Term Goals: To be achieved in: 2 weeks  1. Independent in HEP and progression per patient tolerance, in order to prevent re-injury. [] Progressing: [x] Met: [] Not Met: [] Adjusted  2. Patient will have a decrease in pain by 20-30% to facilitate improvement in movement, function, and ADLs as indicated by Functional Deficits.   [] Progressing: [x] Met: [] Not Met: [] Adjusted     Long Term Goals: To be achieved in: at d/c   1. Improve WOMAC functional outcome score fby 10 points or more to assist with reaching prior level of function. [x] Progressing: [] Met: [] Not Met: [] Adjusted  2. Patient will have a decrease in pain by 40-50% to facilitate improvement in movement, function, and ADLs as indicated by Functional Deficits. [x] Progressing: [] Met: [] Not Met: [] Adjusted  3. Patient will demonstrate increased AROM to +5 DF, 35 inv, 25 evr to allow for proper joint functioning as indicated by patients Functional Deficits. [x] Progressing: [] Met: [] Not Met: [] Adjusted  4. Patient will demonstrate an increase in Strength to 5/5 in R ankle/LE to allow for proper functional mobility as indicated by patients Functional Deficits. [x] Progressing: [] Met: [] Not Met: [] Adjusted  5. Patient will return to amb without brace/AD without increased symptoms or restriction. [] Progressing: [x] Met: [] Not Met: [] Adjusted  6.  \"Work and sports\"(patient specific functional goal)    [x] Progressing: [] Met: [] Not Met: [] Adjusted         ASSESSMENT:  1/26: pt's s/s consistent with post surgical status R foot/ankle; skilled PT necessary for ROM/strength/gait  1/31: skilled PT to gradually progress WB, ROM, balance and strength; [place] new ex well   2/7: tolerated WB progression well today with skilled PT/supervision    2/9: pt belongs to [employer] and was able to go and swim a few laps of breaststroke and freestyle without much c/o; pt remains tight haile with DF ROM in ankle; cont skilled therapy for ROM and functional strength; first steps out of bed are getting a little better     2/14: gradually returning to normal ADLs and inc activity levels well; cont skilled therapy to progress functional strength also  2/21: tolerating progression of PT ex and gradual return to activity very well   2/28: inc pain with trial of prolonged walking today; will see MD for assessment today and pending RTW  3/2: modifications with POC due to cont soreness; will resume when able; pt to RTW light duty next week   3/7: guidance with skilled PT to resume all exercises today due to pain c/o were less and pt tolerated this well  3/14: progressing ex with increased impact as tolerated     Treatment/Activity Tolerance:  [x] Patient tolerated treatment well [] Patient limited by fatique  [] Patient limited by pain  [] Patient limited by other medical complications  [] Other:     Overall Progression Towards Functional goals/ Treatment Progress Update:  [x] Patient is progressing as expected towards functional goals listed. [] Progression is slowed due to complexities/Impairments listed. [] Progression has been slowed due to co-morbidities. [] Plan just implemented, too soon to assess goals progression <30days   [] Goals require adjustment due to lack of progress  [] Patient is not progressing as expected and requires additional follow up with physician  [] Other    Prognosis for POC: [x] Good [] Fair  [] Poor    Patient requires continued skilled intervention: [x] Yes  [] No        PLAN: Add as indicated above  [x] Continue per plan of care [] Alter current plan (see comments)  [] Plan of care initiated [] Hold pending MD visit [] Discharge    Electronically signed by: Dolly Silver, PTMPT [id number]    Note: If patient does not return for scheduled/recommended follow up visits, this note will serve as a discharge from care along with the most recent update on progress.

## 2023-03-20 ENCOUNTER — APPOINTMENT (OUTPATIENT)
Dept: PHYSICAL THERAPY | Age: 58
End: 2023-03-20
Payer: COMMERCIAL

## 2023-03-21 ENCOUNTER — HOSPITAL ENCOUNTER (OUTPATIENT)
Dept: PHYSICAL THERAPY | Age: 58
Setting detail: THERAPIES SERIES
Discharge: HOME | End: 2023-03-21
Payer: COMMERCIAL

## 2023-03-21 PROCEDURE — 97110 THERAPEUTIC EXERCISES: CPT

## 2023-03-21 PROCEDURE — 97530 THERAPEUTIC ACTIVITIES: CPT

## 2023-03-21 PROCEDURE — 97112 NEUROMUSCULAR REEDUCATION: CPT

## 2023-03-21 NOTE — FLOWSHEET NOTE
for normal function with self care, mobility, lifting and ambulation. If [facility] Please Indicate Time In/Out  CPT Code Time in Time out   Eval     Ex 10:30 10:45   NMR 10:45 10:55   TA 10:55 11:07   Man 11:07 11:10   CP 11:10 11:25       Charges:  Timed Code Treatment Minutes: 40   Total Treatment Minutes: 55      [] EVAL (LOW) 47605 (typically 20 minutes face-to-face)  [] EVAL (MOD) 99409 (typically 30 minutes face-to-face)  [] EVAL (HIGH) 95809 (typically 45 minutes face-to-face)  [] RE-EVAL     [x] TT(19985) x     [] Dry needle 1 or 2 Muscles (13659)  [x] NMR (12640) x     [] Dry needle 3+ Muscles (54353)  [] Manual (77552) x     [] Ultrasound (04017) x  [x] TA (37104) x     [] Mech Traction (25489)  [] ES(attended) (41312)     [] ES (un) (55595):   [] Vasopump (94332) [] Ionto (78577)   [] Other:    GOALS:  *updated on 2/28  *sees MD today and anticipating potential RTW  *amb without brace, but tends to put ACE wrap on first thing in the AM until things loosen up and then takes it off later in the day (discussed option of compression sleeve/support for ankle)   *taking OTC ibuprofen as needed   *[place] NWB activities (swimming and biking) without problem, but WB activities like walking x 30 min will inc pain  *hep daily   *pain avg 2-3/10 with typical day; overall feels 60% better  *feels strength is improving and ROM is 80% there but functionally still having trouble with prolonged amb, steps, household tasks like carrying laundry up/dwn steps    *ROM DF 0, PF 66, inv 28 active/32 passive, evr 22 active   *MMT: DF 5/5, inv 5/5, evr 5/5       Patient stated goal: \" walk without brace\"  [] Progressing: [x] Met: [] Not Met: [] Adjusted     Therapist goals for Patient:   Short Term Goals: To be achieved in: 2 weeks  1. Independent in HEP and progression per patient tolerance, in order to prevent re-injury. [] Progressing: [x] Met: [] Not Met: [] Adjusted  2.  Patient will have a decrease in pain by 20-30% to

## 2023-03-23 ENCOUNTER — HOSPITAL ENCOUNTER (OUTPATIENT)
Dept: PHYSICAL THERAPY | Age: 58
Setting detail: THERAPIES SERIES
Discharge: HOME | End: 2023-03-23
Payer: COMMERCIAL

## 2023-03-23 NOTE — FLOWSHEET NOTE
[place] [facility], [facility]  [address] [facility] [place], [facility]  Phone: [phone]   Fax:     [phone]    Physical Therapy  Cancellation/No-show Note  Patient Name:  Miranda Arias  :  1965   Date:  2023  Cancelled visits to date: 1  No-shows to date: 1    Patient status for today's appointment patient:  []  Cancelled  []  Rescheduled appointment  [x]  No-show     Reason given by patient:  []  Patient ill  []  Conflicting appointment  []  No transportation    []  Conflict with work  [x]  No reason given  []  Other:     Comments:      Phone call information:   [x]  Phone call made today to patient at _ time at number provided:      []  Patient answered, conversation as follows:    [x]  Patient did not answer, message left as follows: unable to leave msg; mailbox full  []  Phone call not made today    Electronically signed by:  Rajeev Vila, PTMPT [id number]

## 2023-03-28 ENCOUNTER — HOSPITAL ENCOUNTER (OUTPATIENT)
Dept: PHYSICAL THERAPY | Age: 58
Setting detail: THERAPIES SERIES
Discharge: HOME | End: 2023-03-28
Payer: COMMERCIAL

## 2023-03-28 PROCEDURE — 97530 THERAPEUTIC ACTIVITIES: CPT

## 2023-03-28 PROCEDURE — 97110 THERAPEUTIC EXERCISES: CPT

## 2023-03-28 NOTE — FLOWSHEET NOTE
x daily - 7 x weekly - 1 sets - 10 reps  Long Sitting Ankle Eversion with Resistance - 1 x daily - 7 x weekly - 1 sets - 10 reps        Therapeutic Exercise and NMR EXR  [x] (84023) Provided verbal/tactile cueing for activities related to strengthening, flexibility, endurance, ROM for improvements in LE, proximal hip, and core control with self care, mobility, lifting, ambulation.  [] (97083) Provided verbal/tactile cueing for activities related to improving balance, coordination, kinesthetic sense, posture, motor skill, proprioception  to assist with LE, proximal hip, and core control in self care, mobility, lifting, ambulation and eccentric single leg control.  [address] and Therapeutic Activities:    [x] (68348 or 23697) Provided verbal/tactile cueing for activities related to improving balance, coordination, kinesthetic sense, posture, motor skill, proprioception and motor activation to allow for proper function of core, proximal hip and LE with self care and ADLs and functional mobility.   [] (31005) Gait Re-education- Provided training and instruction to the patient for proper LE, core and proximal hip recruitment and positioning and eccentric body weight control with ambulation re-education including up and down stairs     Home Exercise Program:    [x] (27368) Reviewed/Progressed HEP activities related to strengthening, flexibility, endurance, ROM of core, proximal hip and LE for functional self-care, mobility, lifting and ambulation/stair navigation   [] (66895)Reviewed/Progressed HEP activities related to improving balance, coordination, kinesthetic sense, posture, motor skill, proprioception of core, proximal hip and LE for self care, mobility, lifting, and ambulation/stair navigation      Manual Treatments:  PROM / STM / Oscillations-Mobs:  G-I, II, III, IV (PA's, Inf., Post.)  [x] (15570) Provided manual therapy to mobilize LE, proximal hip and/or LS spine soft tissue/joints for the purpose of

## 2023-03-30 ENCOUNTER — HOSPITAL ENCOUNTER (OUTPATIENT)
Dept: PHYSICAL THERAPY | Age: 58
Setting detail: THERAPIES SERIES
Discharge: HOME | End: 2023-03-30
Payer: COMMERCIAL

## 2023-03-30 PROCEDURE — 97110 THERAPEUTIC EXERCISES: CPT

## 2023-03-30 PROCEDURE — 97530 THERAPEUTIC ACTIVITIES: CPT

## 2023-03-30 NOTE — FLOWSHEET NOTE
[facility] - Outpatient Rehabilitation and Therapy, [place]  [address] [facility]  Phone: [phone]   Fax:     [phone]       Physical Therapy Discharge Summary    Dear Stanley Mckinnon MD,    We had the pleasure of treating the following patient for physical therapy services at [address]. A summary of our findings can be found in the discharge summary below. If you have any questions or concerns regarding these findings, please do not hesitate to contact me at the office phone number above.   Thank you for the referral.     Physician Signature:________________________________Date:__________________  By signing above (or electronic signature), therapists plan is approved by physician      Overall Response to Treatment:   [x]Patient is responding well to treatment and improvement is noted with regards  to goals   [x]Patient should continue to improve in reasonable time if they continue HEP   []Patient has plateaued and is no longer responding to skilled PT intervention    []Patient is getting worse and would benefit from return to referring MD   []Patient unable to adhere to initial POC   [x]Other:   updated on 3/30  *RTW - light duty   *MD 5/30 with consultation on getting hardware removal   *no ACE wrap/brace needed for about 3 wks  *taking OTC ibuprofen less often too (none needed x 2 weeks)   *[place] walking, light jog/trot short distances with dog walking, all other ex without c/o   *hep daily   *pain 0-1/10 with typical day with mostly stiffness and occasional brief pains; overall feels 90% better  *feels ROM is about 95-98% back to normal   *ROM DF 1, PF 66, inv 28 active/38 passive, evr 30 active   *MMT: DF 5/5, inv 5/5, evr 5/5; SL HR    Date range of Visits: 1/26/23-3/30/23  Total Visits: 17        Physical Therapy Treatment Note/ Progress Report:     Date:  03/30/2023    Patient Name:  Edinson Gomez